# Patient Record
Sex: FEMALE | Race: WHITE | NOT HISPANIC OR LATINO | Employment: OTHER | URBAN - METROPOLITAN AREA
[De-identification: names, ages, dates, MRNs, and addresses within clinical notes are randomized per-mention and may not be internally consistent; named-entity substitution may affect disease eponyms.]

---

## 2023-06-06 ENCOUNTER — HOSPITAL ENCOUNTER (OUTPATIENT)
Dept: DATA CONVERSION | Facility: HOSPITAL | Age: 78
End: 2023-06-06
Attending: PHYSICIAN ASSISTANT
Payer: MEDICARE

## 2023-06-06 DIAGNOSIS — C50.012 MALIGNANT NEOPLASM OF NIPPLE AND AREOLA, LEFT FEMALE BREAST (MULTI): ICD-10-CM

## 2023-06-06 DIAGNOSIS — R91.8 OTHER NONSPECIFIC ABNORMAL FINDING OF LUNG FIELD: ICD-10-CM

## 2023-06-09 LAB
COMPLETE PATHOLOGY REPORT: NORMAL
CONVERTED CLINICAL DIAGNOSIS-HISTORY: NORMAL
CONVERTED FINAL DIAGNOSIS: NORMAL
CONVERTED FINAL REPORT PDF LINK TO COPY AND PASTE: NORMAL
CONVERTED GROSS DESCRIPTION: NORMAL

## 2023-07-24 ENCOUNTER — HOSPITAL ENCOUNTER (OUTPATIENT)
Dept: DATA CONVERSION | Facility: HOSPITAL | Age: 78
End: 2023-07-24
Attending: INTERNAL MEDICINE | Admitting: INTERNAL MEDICINE
Payer: MEDICARE

## 2023-07-24 DIAGNOSIS — R91.1 SOLITARY PULMONARY NODULE: ICD-10-CM

## 2023-07-24 LAB — POCT GLUCOSE: 96 MG/DL (ref 74–99)

## 2023-07-26 LAB
ADENOVIRUS QPCR, VIRC: NOT DETECTED COPIES/ML
ADENOVIRUS RVP, VIRC: NOT DETECTED
ASPERGILLUS GALACTOMANNAN EIA (NON-BLOOD SPECIMEN): 0.1
CHLAMYDIA.PNEUMONIAE PCR, VIRC: NOT DETECTED
COMPLETE PATHOLOGY REPORT: NORMAL
COMPLETE PATHOLOGY REPORT: NORMAL
CONVERTED CLINICAL DIAGNOSIS-HISTORY: NORMAL
CONVERTED CLINICAL DIAGNOSIS-HISTORY: NORMAL
CONVERTED DIAGNOSIS COMMENT: NORMAL
CONVERTED FINAL DIAGNOSIS: NORMAL
CONVERTED FINAL DIAGNOSIS: NORMAL
CONVERTED FINAL REPORT PDF LINK TO COPY AND PASTE: NORMAL
CONVERTED FINAL REPORT PDF LINK TO COPY AND PASTE: NORMAL
CONVERTED GROSS DESCRIPTION: NORMAL
CONVERTED RAPID EVALUATION: NORMAL
CONVERTED SPECIMEN DESCRIPTION: NORMAL
CYTOMEGALOVIRUS DNA PCR, (NON-BLOOD SPECI: NOT DETECTED IU/ML
ENTEROVIRUS/RHINOVIRUS RVP, VIRC: NOT DETECTED
HUMAN BOCAVIRUS RVP, VIRC: NOT DETECTED
HUMAN CORONAVIRUS RVP, VIRC: NOT DETECTED
HUMAN HERPESVIRUS-6 DNA PCR, QUANTITATIVE (NON-BLOOD SPECIMEN): NOT DETECTED COPIES/ML
INFLUENZA A , VIRC: NOT DETECTED
INFLUENZA A H1N1-09 , VIRC: NOT DETECTED
INFLUENZA B PCR, VIRC: NOT DETECTED
LEGIONELLA PNEUMO PCR, VIRC: NOT DETECTED
METAPNEUMOVIRUS , VIRC: NOT DETECTED
MYCOPLASMA.PNEUMONIAE PCR, VIRC: NOT DETECTED
PAN.LEGIONELLA PCR, VIRC: NOT DETECTED
PARAINFLUENZA PCR, VIRC: NOT DETECTED
PNEUMOCYSTIS PCR,QUANTITATIVE (NON-BLOOD SPECIMEN): NOT DETECTED COPIES/ML
RSV PCR, RVP, VIRC: NOT DETECTED

## 2023-07-27 LAB
GRAM STAIN: ABNORMAL
RESPIRATORY CULTURE/SMEAR: ABNORMAL

## 2023-08-28 ENCOUNTER — HOSPITAL ENCOUNTER (OUTPATIENT)
Dept: DATA CONVERSION | Facility: HOSPITAL | Age: 78
Discharge: HOME | End: 2023-08-28
Payer: MEDICARE

## 2023-08-28 DIAGNOSIS — R27.0 ATAXIA, UNSPECIFIED: ICD-10-CM

## 2023-08-28 DIAGNOSIS — N39.41 URGE INCONTINENCE: ICD-10-CM

## 2023-08-28 LAB
ANION GAP SERPL CALCULATED.3IONS-SCNC: 13 MMOL/L (ref 0–19)
BASOPHILS # BLD AUTO: 0.05 K/UL (ref 0–0.22)
BASOPHILS NFR BLD AUTO: 0.8 % (ref 0–1)
BILIRUB UR QL STRIP.AUTO: NEGATIVE
BUN SERPL-MCNC: 8 MG/DL (ref 8–25)
BUN/CREAT SERPL: 10 RATIO (ref 8–21)
CALCIUM SERPL-MCNC: 9.2 MG/DL (ref 8.5–10.4)
CHLORIDE SERPL-SCNC: 105 MMOL/L (ref 97–107)
CLARITY UR: CLEAR
CO2 SERPL-SCNC: 21 MMOL/L (ref 24–31)
COLOR UR: YELLOW
CREAT SERPL-MCNC: 0.8 MG/DL (ref 0.4–1.6)
DEPRECATED RDW RBC AUTO: 46 FL (ref 37–54)
DIFFERENTIAL METHOD BLD: ABNORMAL
EOSINOPHIL # BLD AUTO: 0.19 K/UL (ref 0–0.45)
EOSINOPHIL NFR BLD: 3 % (ref 0–3)
ERYTHROCYTE [DISTWIDTH] IN BLOOD BY AUTOMATED COUNT: 13.9 % (ref 11.7–15)
GFR SERPL CREATININE-BSD FRML MDRD: 74 ML/MIN/1.73 M2
GLUCOSE SERPL-MCNC: 108 MG/DL (ref 65–99)
GLUCOSE UR STRIP.AUTO-MCNC: NEGATIVE MG/DL
HCT VFR BLD AUTO: 35.9 % (ref 36–44)
HGB BLD-MCNC: 11.7 GM/DL (ref 12–15)
HGB UR QL STRIP.AUTO: ABNORMAL /HPF (ref 0–3)
HGB UR QL: NEGATIVE
IMM GRANULOCYTES # BLD AUTO: 0.03 K/UL (ref 0–0.1)
KETONES UR QL STRIP.AUTO: NEGATIVE
LEUKOCYTE ESTERASE UR QL STRIP.AUTO: NEGATIVE
LYMPHOCYTES # BLD AUTO: 1.6 K/UL (ref 1.2–3.2)
LYMPHOCYTES NFR BLD MANUAL: 25.5 % (ref 20–40)
MCH RBC QN AUTO: 29.5 PG (ref 26–34)
MCHC RBC AUTO-ENTMCNC: 32.6 % (ref 31–37)
MCV RBC AUTO: 90.4 FL (ref 80–100)
MICROSCOPIC (UA): ABNORMAL
MONOCYTES # BLD AUTO: 0.68 K/UL (ref 0–0.8)
MONOCYTES NFR BLD MANUAL: 10.8 % (ref 0–8)
NEUTROPHILS # BLD AUTO: 3.73 K/UL
NEUTROPHILS # BLD AUTO: 3.73 K/UL (ref 1.8–7.7)
NEUTROPHILS.IMMATURE NFR BLD: 0.5 % (ref 0–1)
NEUTS SEG NFR BLD: 59.4 % (ref 50–70)
NITRITE UR QL STRIP.AUTO: NEGATIVE
NRBC BLD-RTO: 0 /100 WBC
PH UR STRIP.AUTO: 5.5 [PH] (ref 4.6–8)
PLATELET # BLD AUTO: 142 K/UL (ref 150–450)
PMV BLD AUTO: 11.7 CU (ref 7–12.6)
POTASSIUM SERPL-SCNC: 4 MMOL/L (ref 3.4–5.1)
PROT UR STRIP.AUTO-MCNC: ABNORMAL MG/DL
RBC # BLD AUTO: 3.97 M/UL (ref 4–4.9)
SODIUM SERPL-SCNC: 139 MMOL/L (ref 133–145)
SP GR UR STRIP.AUTO: 1.02 (ref 1–1.03)
UNSPECIFIED CRY UR QL COMP ASSIST: ABNORMAL
URINE CULTURE: ABNORMAL
UROBILINOGEN UR QL STRIP.AUTO: NORMAL MG/DL (ref 0–1)
WBC # BLD AUTO: 6.3 K/UL (ref 4.5–11)
WBC #/AREA URNS AUTO: ABNORMAL /HPF (ref 0–3)

## 2023-08-31 LAB
FUNGAL CULTURE/SMEAR: ABNORMAL
FUNGAL SMEAR: ABNORMAL

## 2023-09-13 LAB
AFB CULTURE: NORMAL
AFB STAIN: NORMAL

## 2023-09-17 VITALS
HEART RATE: 80 BPM | BODY MASS INDEX: 28 KG/M2 | WEIGHT: 158 LBS | TEMPERATURE: 98.3 F | DIASTOLIC BLOOD PRESSURE: 76 MMHG | HEIGHT: 63 IN | SYSTOLIC BLOOD PRESSURE: 138 MMHG | OXYGEN SATURATION: 95 %

## 2023-09-29 VITALS — BODY MASS INDEX: 27.1 KG/M2 | WEIGHT: 158.73 LBS | HEIGHT: 64 IN

## 2023-10-29 DIAGNOSIS — E11.9 TYPE 2 DIABETES MELLITUS WITHOUT COMPLICATION, WITHOUT LONG-TERM CURRENT USE OF INSULIN (MULTI): Primary | ICD-10-CM

## 2023-10-30 ENCOUNTER — HOSPITAL ENCOUNTER (OUTPATIENT)
Dept: RADIOLOGY | Facility: HOSPITAL | Age: 78
Discharge: HOME | End: 2023-10-30
Payer: MEDICARE

## 2023-10-30 ENCOUNTER — TELEPHONE (OUTPATIENT)
Dept: PRIMARY CARE | Facility: CLINIC | Age: 78
End: 2023-10-30
Payer: MEDICARE

## 2023-10-30 DIAGNOSIS — C34.31 MALIGNANT NEOPLASM OF LOWER LOBE, RIGHT BRONCHUS OR LUNG (MULTI): ICD-10-CM

## 2023-10-30 DIAGNOSIS — E78.2 MIXED HYPERLIPIDEMIA: ICD-10-CM

## 2023-10-30 DIAGNOSIS — I10 PRIMARY HYPERTENSION: Primary | ICD-10-CM

## 2023-10-30 DIAGNOSIS — C50.911 MALIGNANT NEOPLASM OF UNSPECIFIED SITE OF RIGHT FEMALE BREAST (MULTI): ICD-10-CM

## 2023-10-30 PROCEDURE — 71260 CT THORAX DX C+: CPT

## 2023-10-30 PROCEDURE — 71260 CT THORAX DX C+: CPT | Performed by: RADIOLOGY

## 2023-10-30 PROCEDURE — 82565 ASSAY OF CREATININE: CPT | Mod: OUT

## 2023-10-30 PROCEDURE — 2550000001 HC RX 255 CONTRASTS: Performed by: INTERNAL MEDICINE

## 2023-10-30 RX ORDER — METFORMIN HYDROCHLORIDE 500 MG/1
500 TABLET, EXTENDED RELEASE ORAL
Qty: 90 TABLET | Refills: 1 | Status: SHIPPED | OUTPATIENT
Start: 2023-10-30 | End: 2024-03-25 | Stop reason: SDUPTHER

## 2023-10-30 RX ADMIN — IOHEXOL 75 ML: 350 INJECTION, SOLUTION INTRAVENOUS at 16:14

## 2023-10-31 RX ORDER — ATORVASTATIN CALCIUM 40 MG/1
40 TABLET, FILM COATED ORAL DAILY
Qty: 30 TABLET | Refills: 3 | Status: SHIPPED | OUTPATIENT
Start: 2023-10-31 | End: 2024-02-14 | Stop reason: SDUPTHER

## 2023-10-31 RX ORDER — AMLODIPINE BESYLATE 5 MG/1
5 TABLET ORAL DAILY
Qty: 30 TABLET | Refills: 3 | Status: SHIPPED | OUTPATIENT
Start: 2023-10-31 | End: 2024-02-14 | Stop reason: SDUPTHER

## 2023-10-31 RX ORDER — AMLODIPINE BESYLATE 5 MG/1
5 TABLET ORAL DAILY
COMMUNITY
Start: 2023-03-27 | End: 2023-10-31 | Stop reason: SDUPTHER

## 2023-10-31 RX ORDER — ATORVASTATIN CALCIUM 40 MG/1
40 TABLET, FILM COATED ORAL DAILY
COMMUNITY
End: 2023-10-31 | Stop reason: SDUPTHER

## 2023-11-02 PROBLEM — C50.919 MALIGNANT NEOPLASM OF FEMALE BREAST (MULTI): Status: ACTIVE | Noted: 2023-11-02

## 2023-11-02 PROBLEM — R22.0 FACIAL SWELLING: Status: ACTIVE | Noted: 2023-11-02

## 2023-11-02 PROBLEM — I10 HYPERTENSION: Status: ACTIVE | Noted: 2023-11-02

## 2023-11-02 PROBLEM — C34.90 SMALL CELL CARCINOMA OF LUNG (MULTI): Status: ACTIVE | Noted: 2023-11-02

## 2023-11-02 PROBLEM — I95.1 ORTHOSTATIC HYPOTENSION: Status: ACTIVE | Noted: 2023-11-02
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
## 2023-11-02 PROBLEM — E78.5 HYPERLIPIDEMIA: Status: ACTIVE | Noted: 2023-11-02

## 2023-11-02 PROBLEM — I67.9 CEREBROVASCULAR DISEASE: Status: ACTIVE | Noted: 2023-11-02

## 2023-11-02 PROBLEM — G45.9 TRANSIENT ISCHEMIC ATTACK: Status: ACTIVE | Noted: 2023-11-02

## 2023-11-02 PROBLEM — R55 SYNCOPE AND COLLAPSE: Status: ACTIVE | Noted: 2023-11-02

## 2023-11-02 PROBLEM — G62.9 POLYNEUROPATHY: Status: ACTIVE | Noted: 2023-11-02

## 2023-11-02 PROBLEM — R00.1 BRADYCARDIA: Status: ACTIVE | Noted: 2023-11-02

## 2023-11-02 PROBLEM — R42 DIZZINESS: Status: ACTIVE | Noted: 2023-11-02

## 2023-11-02 PROBLEM — R91.1 LUNG NODULE: Status: ACTIVE | Noted: 2023-11-02

## 2023-11-02 PROBLEM — N39.41 URGE INCONTINENCE OF URINE: Status: ACTIVE | Noted: 2023-11-02

## 2023-11-02 PROBLEM — G31.84 MILD COGNITIVE IMPAIRMENT: Status: ACTIVE | Noted: 2023-11-02

## 2023-11-02 PROBLEM — I25.10 CORONARY ARTERIOSCLEROSIS: Status: ACTIVE | Noted: 2023-11-02

## 2023-11-02 PROBLEM — R47.01 APHASIA: Status: ACTIVE | Noted: 2023-11-02

## 2023-11-02 PROBLEM — M54.16 LUMBAR RADICULOPATHY: Status: ACTIVE | Noted: 2018-07-20

## 2023-11-02 PROBLEM — S01.91XA LACERATION OF HEAD: Status: ACTIVE | Noted: 2023-11-02

## 2023-11-02 PROBLEM — M54.30 SCIATICA: Status: ACTIVE | Noted: 2023-11-02

## 2023-11-02 PROBLEM — J44.9 CHRONIC OBSTRUCTIVE PULMONARY DISEASE (MULTI): Status: ACTIVE | Noted: 2023-11-02

## 2023-11-02 PROBLEM — R27.0 ATAXIA: Status: ACTIVE | Noted: 2023-11-02

## 2023-11-02 PROBLEM — E11.9 TYPE 2 DIABETES MELLITUS (MULTI): Status: ACTIVE | Noted: 2023-11-02

## 2023-11-02 PROBLEM — K57.30 DIVERTICULOSIS OF COLON: Status: ACTIVE | Noted: 2023-11-02

## 2023-11-02 PROBLEM — K20.90 ESOPHAGITIS: Status: ACTIVE | Noted: 2023-11-02

## 2023-11-02 PROBLEM — I10 BENIGN ESSENTIAL HYPERTENSION: Status: ACTIVE | Noted: 2023-11-02

## 2023-11-02 PROBLEM — R04.2 HEMOPTYSIS: Status: ACTIVE | Noted: 2023-11-02

## 2023-11-02 PROBLEM — S09.90XA INJURY OF HEAD: Status: ACTIVE | Noted: 2023-11-02

## 2023-11-02 RX ORDER — ESOMEPRAZOLE MAGNESIUM 40 MG/1
CAPSULE, DELAYED RELEASE ORAL
COMMUNITY
End: 2023-12-20 | Stop reason: ALTCHOICE

## 2023-11-02 RX ORDER — LISINOPRIL 2.5 MG/1
TABLET ORAL
COMMUNITY
Start: 2014-04-08 | End: 2023-12-20 | Stop reason: WASHOUT

## 2023-11-02 RX ORDER — PANTOPRAZOLE SODIUM 40 MG/1
TABLET, DELAYED RELEASE ORAL
COMMUNITY
End: 2023-12-20 | Stop reason: ALTCHOICE

## 2023-11-02 RX ORDER — ONDANSETRON HYDROCHLORIDE 8 MG/1
TABLET, FILM COATED ORAL
COMMUNITY
End: 2023-12-20 | Stop reason: ALTCHOICE

## 2023-11-02 RX ORDER — NAPROXEN SODIUM 220 MG/1
TABLET, FILM COATED ORAL
COMMUNITY

## 2023-11-02 RX ORDER — OXYBUTYNIN CHLORIDE 5 MG/1
TABLET ORAL
COMMUNITY
Start: 2023-08-28 | End: 2023-12-20 | Stop reason: ALTCHOICE

## 2023-11-02 RX ORDER — ACETAMINOPHEN 325 MG/1
650 TABLET ORAL EVERY 4 HOURS PRN
COMMUNITY

## 2023-11-02 RX ORDER — LISINOPRIL 10 MG/1
TABLET ORAL
COMMUNITY
End: 2023-12-20 | Stop reason: ALTCHOICE

## 2023-11-02 RX ORDER — IPRATROPIUM BROMIDE AND ALBUTEROL 20; 100 UG/1; UG/1
SPRAY, METERED RESPIRATORY (INHALATION)
COMMUNITY
Start: 2014-08-11

## 2023-11-02 RX ORDER — MIRTAZAPINE 15 MG/1
TABLET, FILM COATED ORAL
COMMUNITY
End: 2023-12-20 | Stop reason: ALTCHOICE

## 2023-11-02 RX ORDER — PROCHLORPERAZINE MALEATE 10 MG
TABLET ORAL
COMMUNITY
End: 2023-12-20 | Stop reason: ALTCHOICE

## 2023-11-02 RX ORDER — ANASTROZOLE 1 MG/1
TABLET ORAL
COMMUNITY
Start: 2014-01-10 | End: 2023-12-20 | Stop reason: ALTCHOICE

## 2023-11-02 RX ORDER — GABAPENTIN 300 MG/1
1 CAPSULE ORAL 3 TIMES DAILY
COMMUNITY
End: 2023-12-20 | Stop reason: ALTCHOICE

## 2023-11-02 RX ORDER — CARVEDILOL 3.12 MG/1
TABLET ORAL
COMMUNITY
Start: 2014-06-05 | End: 2023-12-20 | Stop reason: ALTCHOICE

## 2023-11-03 ENCOUNTER — INFUSION (OUTPATIENT)
Dept: HEMATOLOGY/ONCOLOGY | Facility: CLINIC | Age: 78
End: 2023-11-03
Payer: MEDICARE

## 2023-11-03 ENCOUNTER — OFFICE VISIT (OUTPATIENT)
Dept: HEMATOLOGY/ONCOLOGY | Facility: CLINIC | Age: 78
End: 2023-11-03
Payer: MEDICARE

## 2023-11-03 ENCOUNTER — APPOINTMENT (OUTPATIENT)
Dept: LAB | Facility: HOSPITAL | Age: 78
End: 2023-11-03
Payer: MEDICARE

## 2023-11-03 VITALS
WEIGHT: 147.82 LBS | SYSTOLIC BLOOD PRESSURE: 115 MMHG | BODY MASS INDEX: 27.2 KG/M2 | HEIGHT: 62 IN | HEART RATE: 79 BPM | TEMPERATURE: 97.2 F | DIASTOLIC BLOOD PRESSURE: 72 MMHG | RESPIRATION RATE: 16 BRPM | OXYGEN SATURATION: 95 %

## 2023-11-03 DIAGNOSIS — C34.90 SMALL CELL CARCINOMA OF LUNG (MULTI): ICD-10-CM

## 2023-11-03 DIAGNOSIS — B49 FUNGAL INFECTION OF LUNG: Primary | ICD-10-CM

## 2023-11-03 DIAGNOSIS — C34.31 MALIGNANT NEOPLASM OF LOWER LOBE, RIGHT BRONCHUS OR LUNG (MULTI): ICD-10-CM

## 2023-11-03 PROCEDURE — 1159F MED LIST DOCD IN RCRD: CPT | Performed by: INTERNAL MEDICINE

## 2023-11-03 PROCEDURE — 1125F AMNT PAIN NOTED PAIN PRSNT: CPT | Performed by: INTERNAL MEDICINE

## 2023-11-03 PROCEDURE — 1160F RVW MEDS BY RX/DR IN RCRD: CPT | Performed by: INTERNAL MEDICINE

## 2023-11-03 PROCEDURE — 3078F DIAST BP <80 MM HG: CPT | Performed by: INTERNAL MEDICINE

## 2023-11-03 PROCEDURE — 99213 OFFICE O/P EST LOW 20 MIN: CPT | Performed by: INTERNAL MEDICINE

## 2023-11-03 PROCEDURE — 3074F SYST BP LT 130 MM HG: CPT | Performed by: INTERNAL MEDICINE

## 2023-11-03 RX ORDER — VITAMIN B COMPLEX
1 CAPSULE ORAL DAILY
COMMUNITY

## 2023-11-03 RX ORDER — FERROUS SULFATE, DRIED 160(50) MG
1000 TABLET, EXTENDED RELEASE ORAL DAILY
COMMUNITY

## 2023-11-03 ASSESSMENT — PATIENT HEALTH QUESTIONNAIRE - PHQ9
2. FEELING DOWN, DEPRESSED OR HOPELESS: NOT AT ALL
SUM OF ALL RESPONSES TO PHQ9 QUESTIONS 1 AND 2: 0
1. LITTLE INTEREST OR PLEASURE IN DOING THINGS: NOT AT ALL

## 2023-11-03 ASSESSMENT — COLUMBIA-SUICIDE SEVERITY RATING SCALE - C-SSRS
1. IN THE PAST MONTH, HAVE YOU WISHED YOU WERE DEAD OR WISHED YOU COULD GO TO SLEEP AND NOT WAKE UP?: NO
2. HAVE YOU ACTUALLY HAD ANY THOUGHTS OF KILLING YOURSELF?: NO
6. HAVE YOU EVER DONE ANYTHING, STARTED TO DO ANYTHING, OR PREPARED TO DO ANYTHING TO END YOUR LIFE?: NO

## 2023-11-03 ASSESSMENT — NCCN CANCER DISTRESS MANAGEMENT: NCCN PHYSICAL CONCERNS: 6

## 2023-11-03 ASSESSMENT — ENCOUNTER SYMPTOMS
DEPRESSION: 0
LOSS OF SENSATION IN FEET: 0
OCCASIONAL FEELINGS OF UNSTEADINESS: 1

## 2023-11-03 ASSESSMENT — PAIN SCALES - GENERAL: PAINLEVEL: 2

## 2023-11-03 NOTE — PROGRESS NOTES
Patient ID: Vidhi Medrano is a 78 y.o. female.  Referring Physician: No referring provider defined for this encounter.  Primary Care Provider: Vineet Craig MD      Subjective    HPI    Chief Complaint: follow up lung cancer   Interval History:    Patient of Dr. White and DORIS Schreiber      Reason for visit: Right Lung nodule , hx of small cell lung cancer      HPI      78 year old woman with  hx of limited stage small cell lung cancer diagnosed June 2018 s/p definitive chemoxrt with carboplatin/Etoposide followed by PCI, cTxN2  presented with only hyper metabolic active mediastinal nodes and no lung mass , HTN, HLD, DM, CAD s/p MI, TIA, peripheral neuropathy, smoking, COPD, OA s/p left TKA, R breast cancer originally diagnosed  in 2011 s/p R mastectomy ER/MA positive, pT1cN0, 1.1 cm tumor, grade 1, s/p adjuvant Arimidex until her lung cancer diagnosis,  who was following for surveillance of her cancer      she had a CT screening in 2018 that showed an enlarged prevascular LN   PET scan showed uptake in the LN and findings suggestive of adrenal adenoma  brain MRI showed single 6.5 contrast enhancement area overlying the left frontal lobe non specific   treated with concurrent chemoradiation followed by PCI   I also see a report of a mediastinal lymph node biopsy in Nov 2018 that showed findings suggestive of CD10+ lymphoproliferative disease, not clear the circumstances of biopsy from available records     She was on surveillance with CT scans   CT chest April 2021: RLL calcified granuloma , no other susupicious nodules, mildly prominent subcarinal LN   CT scan of chest Oct 2021: small nodule AZUCENA measuring 4 mm, stable   Ct scan Nov 2022: stable tiny b/l nodules, new AZUCENA 3 mm nodule looks suspicious, 8 x 6 mm infiltrative nodule in AZUCENA could be slowly growing tumor   CT scan March 2023: increased nodule in AZUCENA 9 x 8 mm previously 5 x 4 mm (?comparison CT) , new 4 mm RUL nodule  PET scan 5/9/23:  intense metabolic activity in AZUCENA nodule SUV 6.1, non specific activity in area of femoral head   Bone scan 5/26: no abnormal radiotracer uptake   6/6/23 bx of AZUCENA nodule: Alveloar lung parenchyma, non diagnostic, limited tissue      she denies major change in breathing   no increased SOB or COLINDRES   she can walk a bit but not too long   she feels weakness in her legs   this has worsened over the last year or so   now only can do some walking   hx of TIA in Feb 2023  had some falls   no headaches or back pain   gets lightheaded   has memory changes   no increased cough   no oxygen requirements   eating and drinking well, it is stable now      interval history- 11/3/23      She has balance issues, and had few recurrent falls in the last few months ,  she is using a different walker now   She is having physical therapy as well and will possibly see neurology for her cognitive function   No respiratory symptoms at this time, no increased cough or SOB   Eating and drinking well, she does eat big meals but she snacks in between   no nausea, vomiting   has chronic neuropathic pain in the feet and possibly will see neurology as above  Denies any fever, infections, admissions for other issues        PAST MEDICAL HISTORY:   hx of limited stage small cell lung cancer diagnosed June 2018 s/p definitive chemoxrt with carboplatin/Etoposide followed by PCI , cTxN2 presented with only hyper metabolic active mediastinal nodes and no lung mass , HTN, HLD, DM, CAD s/p MI, TIA, peripheral neuropathy, smoking, COPD, OA s/p left TKA, R breast cancer originally diagnosed in 2011 s/p R mastectomy     SOCIAL HISTORY:   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     REVIEW OF  "SYSTEMS:  Pertinent finding as per the history above.  There are no additional specific symptoms pertaining to eyes, ENT, hematologic, lymphatic, neurological, psychiatric, cardiac,  pulmonary, GI, , endocrine, rheumatic, dermatological, or musculoskeletal systems.  All other systems have been reviewed and generally negative and noncontributory.     PHYSICAL EXAMINATION:    GENERAL:  Age-appropriate, in no acute discomfort.    VITAL SIGNS:  Reviewed in the EMR and were noted to be stable.    HEENT:  Normocephalic and atraumatic.  Mucous membranes are moist. No oral lesions.   NECK:  Supple without lymphadenopathy.  No thyromegaly or bruits.    CHEST:  Clear to auscultation bilaterally with some diminished breath sounds   HEART:  Regular in rate and rhythm.  No gallop, rub, or murmur.    ABDOMEN:  Soft, nontender, and nondistended. No hepatosplenomegaly.    EXTREMITIES:  No cyanosis, clubbing, or edema.  NEUROLOGICAL:  Alert, awake, and oriented.  b/l LE weakness R>L , in wheelchair   LYMPHATICS: No significant lymphadenopathy.     LAB DATA:  Latest labs were reviewed in the EMR and from the outside sources.    Objective   BSA: 1.72 meters squared  /72 (BP Location: Left arm, Patient Position: Sitting, BP Cuff Size: Large adult)   Pulse 79   Temp 36.2 °C (97.2 °F) (Temporal)   Resp 16   Ht (S) 1.585 m (5' 2.4\")   Wt 67 kg (147 lb 13.1 oz)   SpO2 95%   BMI 26.69 kg/m²       No family history on file.  Oncology History    No history exists.       Vidhi Medrano  has no history on file for tobacco use.  She  has no history on file for alcohol use.  She  has no history on file for drug use.    Results     FINAL DIAGNOSIS   LEFT LUNG BIOPSY:   --ALVEOLATED LUNG PARENCHYMA WITH NO SIGNIFICANT DIAGNOSTIC FINDINGS. SEE   NOTE.     Note:  There is limited tissue present for evaluation. Deeper levels were   attempted, however, no additional tissue is identified block. No mass lesion   is identified in this " "biopsy specimen. Rebiopsy is recommended as clinically   indicated.        Electronically Signed Out By TAE LANTIGUA MD/HEDY   By the signature on this report, the individual or group listed as making the   Final Interpretation/Diagnosis certifies that they  have reviewed this case.   Diagnostic interpretation performed at [facility] [address] [place] [id number]       Clinical History:   Left lung mass     Specimens Submitted As:   A: LEFT LUNG BIOPSY      Gross Description:   Received in formalin, labeled with the patient´s name and hospital number and   \"A, left lung biopsy\", are multiple minute pink-white, soft tissue fragments   aggregating to 1.5 x 0.1 x 0.1 cm. The specimen is submitted  in toto in one   cassette. The specimen may not survive processing.   [facility]   Department of Pathology   [address] [place] [address]     =========================== Next Report ===========================     Cytology-Non GYN [Jul 26 2023 12:11PM]  ([id number])    Specimens: BRONCHIAL BRUSH OF LEFT UPPER LOBE NODULE /FINE NEEDLE ASPIRATION LUNG - LEFT UPPER LOBE NODULE /BRONCHO-ALVEOLAR  LAVAGE OF LEFT UPPER LOBE /FINE NEEDLE ASPIRATION 11L LYMPH NODE /BRONCHIAL BRUSH OF LEFT UPPER LOBE NODULE /FINE NEEDLE ASPIRATION LUNG - LEFT UPPER LOBE NODULE /BRONCHO-ALVEOLAR LAVAGE OF LEFT UPPER LOBE /FINE NEEDLE ASPIRATION 11L LYMPH NODE        MRN: [id number]   Patient Name GURPREET SOLOMON   Accession #: [id number]   Date of Procedure: 7/24/2023   Date Reported: 7/26/2023   Date Received: 7/24/2023   Date of Birth / Sex 1945 (Age: 78) / F   Race: WHITE    Submitting Physician: GEOVANY ANDERSON MD   Attending Physician: CARLOS GROSS M.D.       Other External#     FINAL CYTOLOGICAL INTERPRETATION     A. BRONCHIAL BRUSH OF LEFT UPPER LOBE NODULE, CYTOLOGY AND CELL BLOCK:    --BLOOD AND RARE BRONCHIAL CELLS ONLY; NON-DIAGNOSTIC.     B. FINE " NEEDLE ASPIRATION LUNG - LEFT UPPER LOBE NODULE, CYTOLOGY AND CELL   BLOCK:   --NON-DIAGNOSTIC SPECIMEN; BLOOD ONLY.     C. BRONCHO-ALVEOLAR LAVAGE OF LEFT UPPER  LOBE:   --NO MALIGNANT CELLS IDENTIFIED.     D. FINE NEEDLE ASPIRATION 11L LYMPH NODE, CYTOLOGY AND CELL BLOCK:   --NO MALIGNANT CELLS IDENTIFIED. LIMITED LYMPHOID SPECIMEN.     The gross and/or microscopic findings were reviewed  in conjunction with   pathology resident, Alan Martin MD.         Slide(s) initially screened by a Cytotechnologist at [address], [place] [address]        Electronically Signed Out By Job Chang MD     By the signature on this report, the individual or group listed as making the   Final Interpretation/Diagnosis certifies that they have reviewedthis case.   Slide(s) initially screened  by a Cytotechnologist at [facility]   Diagnostic interpretation performed at [address] [place] [address]       Rapid Evaluation       Brushing Immediate Read Result:   A. Non-diagnostic,  specimen consists of bronchial cells.     Fine Needle Aspiration Immediate Read Result:   B. Non-diagnostic, almost entirely blood.   D. Lymphoid, negative.     Pathologist: Anton Gibson M.D. Date: 7.24.2023     Clinical  History       GROWING 9MM PET LEFT UPPER LOBE NODULE; HISTORY OF LIMITED STAGE SMALL CELL   LUNG CANCER.   Source of Specimen   A: BRONCHIAL BRUSH OF LEFT UPPER LOBE NODULE   B: FINE NEEDLE ASPIRATION LUNG - LEFT UPPER LOBE NODULE    C: BRONCHO-ALVEOLAR LAVAGE OF LEFT UPPER LOBE   D: FINE NEEDLE ASPIRATION 11L LYMPH NODE     Specimen Submitted as:   A: BRONCHIAL BRUSH OF LEFT UPPER LOBE NODULE   Pap stain Non-Gyn, Pap stain Non-Gyn, Diff-Quik stain Non-Gyn, Diff-Quik    stain Non-Gyn, CELL BLOCK, H&E, Initial   B: FINE NEEDLE ASPIRATION LUNG - LEFT UPPER LOBE NODULE   Pap stain Non-Gyn, Pap stain Non-Gyn, Diff-Quik stain  Non-Gyn, Diff-Quik   stain Non-Gyn, CELL BLOCK, H&E, Initial   C: BRONCHO-ALVEOLAR  LAVAGE OF LEFT UPPER LOBE   Pap non-gyn ThinPrep slide   D: FINE NEEDLE ASPIRATION 11L LYMPH NODE   Pap stain Non-Gyn, Pap stain Non-Gyn, Diff-Quik stain Non-Gyn, Diff-Quik   stain Non-Gyn, CELL BLOCK, H&E, Initial     Gross Description    A. BRONCHIAL BRUSH OF LEFT UPPER LOBE NODULE: RECEIVED 4 DIRECT SMEARS (2   AIR-DRIED DIFF-QUIK AND 2 SPRAY-FIXED) AND 30cc OF PINK CLEAR NEEDLE RINSE IN   CYTOLYT WITH FEW PARTICLES AND 2 BRUSHES.     B. FINE NEEDLE ASPIRATION LUNG - LEFT  UPPER LOBE NODULE: RECEIVED 4 DIRECT   SMEARS (2AIR-DRIED DIFF-QUIK AND 2 SPRAY-FIXED) AND 30cc OF RED CLEAR NEEDLE   RINSE IN CYTOLYT WITH PARTICLES.     C. BRONCHO-ALVEOLAR LAVAGE OF LEFT UPPER LOBE: RECEIVED 10cc OF FRESH PINK    CLOUDY FLUID WITH PARTICLES.     D. FINE NEEDLE ASPIRATION 11L LYMPH NODE: RECEIVED 4 DIRECT SMEARS (2   AIR-DRIED DIFF-QUIK AND 2 SPRAY-FIXED) AND 40cc OF RED CLEAR NEEDLE RINSE IN   CYTOLYT WITH PARTICLES.              [facility]   Department of Pathology   [address] [place] [address]     =========================== Next Report ===========================      Surgical Pathology [Jul 26 2023 5:03PM] ([id number])    Specimens: AZUCENA NODULE, TBBX     Name GURPREET SOLOMON     Accession #: [id number]   Pathologist: SHAMIR PHILLIPS MD   Date of Procedure: 7/24/2023   Date Received: 7/24/2023   Date Reported 7/26/2023   Submitting Physician: CARLOS GROSS M.D.   Location: [place] Other  External #             FINAL DIAGNOSIS   A. LEFT LUNG, UPPER LOBE NODULE, TRANSBRONCHIAL BIOPSY:   -- SMALL FRAGMENT OF DENSE FIBROUS TISSUE AND BLOOD CLOT; SEE NOTE.   -- MULTIPLE LEVELS EXAMINED FOR FINAL DIAGNOSIS.      NOTE: There is no evidence of a mass forming lesion in this limited biopsy.   Correlation with clinical and radiologic features is needed for further   evaluation. Re-biopsy  may be considered, if clinically warranted.      CT chest w IV contrast    Result Date: 10/31/2023  Interpreted By:  Sofia Corrales and Marshall Colin STUDY: CT CHEST W IV CONTRAST;  10/30/2023 4:11 pm   INDICATION: Signs/Symptoms: restaging  C34.31: Malignant neoplasm of lower lobe, right bronchus or lung C50.911: Malignant neoplasm of right breast.   COMPARISON: CT chest dated 07/24/2023, 03/27/2023 and 11/30/2022.   ACCESSION NUMBER(S): [id number]   ORDERING CLINICIAN: CARLOS GROSS   TECHNIQUE: Helical data acquisition of the chest was obtained  after intravenous administration of 75 mL Omnipaque 350. Images were reformatted in axial, coronal, and sagittal planes.   FINDINGS: LUNGS AND AIRWAYS: The trachea and central airways are patent. No endobronchial lesion.   There is interval decreased prominence of irregular morphology nodule in the left upper lobe which measures up to 9 mm as seen on image 95 when compared with the previous CT chest studies from 07/24/2023. An adjacent curvilinear structure in the anteroinferior aspect of this nodule in the left upper lobe appears similar   Stable size of a 0.3 cm left lower lobe pulmonary nodule (image 129). No new or enlarging discrete suspicious pulmonary nodules.   No focal consolidation, pleural effusion, or pneumothorax.   MEDIASTINUM AND ABHIJEET, LOWER NECK AND AXILLA: The visualized thyroid gland is within normal limits.   No evidence of thoracic lymphadenopathy by CT criteria.   Esophagus appears within normal limits as seen.   HEART AND VESSELS: The thoracic aorta is of normal course and caliber with severe vascular calcifications.   Main pulmonary artery and its branches are normal in caliber.   Mild coronary artery calcifications are seen. The study is not optimized for evaluation of coronary arteries.   The cardiac chambers are not enlarged.   No evidence of pericardial effusion.   UPPER ABDOMEN: Unchanged appearance of low-density adrenal nodules  measuring up to 1.7 cm on the left and 0.9 cm on the right. Unchanged appearance of simple fluid attenuating cysts within the right kidney measuring up to 1.3 cm in diameter. Otherwise, the partially visualized subdiaphragmatic structures are unremarkable in appearance.   CHEST WALL AND OSSEOUS STRUCTURES: Postoperative changes of right mastectomy again noted and unremarkable in appearance. The chest wall soft tissues are otherwise unremarkable in appearance. No acute osseous abnormalities or suspicious osseous lesions.       1.  There is interval decreased prominence of irregular morphology nodule in the left upper lobe which measures up to 9 mm when compared with the previous CT chest study from 07/24/2023. This may represent inflammatory nodule. Continued follow-up is suggested until resolution. An adjacent curvilinear structure in the anteroinferior aspect of this nodule in the left upper lobe appears similar. 2. Additional subcentimeter pulmonary nodules are stable in size as compared with prior exam and described above. No new or enlarging discrete suspicious pulmonary nodules. 3. No acute cardiopulmonary process.     I personally reviewed the images/study and I agree with the resident findings as stated. This study was interpreted at [place], [place].   Signed by: Sofia Corrales 10/31/2023 10:58 PM Dictation workstation:   [id number]     Assessment/Plan      1. History of limited stage small cell lung cancer      hx if limited stage SCLC s/p definitive chemoxrt with carbo/etoposide/ xrt in 2018   followed by PCI   now with a new AZUCENA lung nodule with FDG avidity   non diagnostic biopsy      we discussed again to refer for another bx transcutaneous or via navigational bronchoscopy   she wants to think about it   alternative include evaluation for treatment due to high suspicion and referral to radiation oncology to check eligibility for sbrt given prior  radiation, also wants to wait and think about it      will check a brain MRI in meantime given neurologic symptoms      8/1- MRI brain was negative   referred for EBUS and bx, performed with Dr. Fitch, lung nodule was small and less solid on navigational bronc scan, path showed no malignancy   will plan to repeat a scan in 3 months, but will review imaging on TB if there is any suggestion to treat regardless of biopsy vs surveillance imaging     11/3/2023-  Case was reviewed on the thoracic tumor board and recommended for a repeat CT scan of the chest  The repeat CT scan was done on 10/30/2023 and is reassuring, lung nodule of concern has decreased in the size and there are other stable lung nodules  At this time there is no evidence of malignancy, we will plan to repeat another CT scan of the chest in 6 months and perhaps afterwards she can go to yearly low-dose CT screening if there are no concerning findings    Noted on cultures from the bronchoscopy there was both staph and the mold, she stated she did not take antibiotics or antifungals, but she is pretty much asymptomatic, will refer to infectious disease to determine if any treatment is needed at all     2. hx of breast cancer   s/p right mastectomy   completed arimidex adjuvant endocrine therapy      RTC 6 months with CT chest    Stanfrod Pizarro MD

## 2023-11-13 ENCOUNTER — TELEPHONE (OUTPATIENT)
Dept: PRIMARY CARE | Facility: CLINIC | Age: 78
End: 2023-11-13
Payer: MEDICARE

## 2023-11-13 NOTE — TELEPHONE ENCOUNTER
Dee called to state that Pt isn't having any improvement with urinary incontinence medication, Oxybu___. She is having side effects such as confusion, awakening in the middle of the night, disorientation, etc. Dee would like to know if the pt can stop the drug cold turkey, or does she need to be weaned off of it?     Also, the neuro doc that pt was referred to is in the same office as the last one, and Dee doesn't want to drive to [place]. Can Dr CORONA recommend a neuro in [facility] or [place]?    Requests c/b.

## 2023-11-14 NOTE — TELEPHONE ENCOUNTER
Dee called back this morning stating she had missed a call. She is not able to get her VM mail open and is asking to speak with the nurse.

## 2023-11-20 ENCOUNTER — TELEPHONE (OUTPATIENT)
Dept: PRIMARY CARE | Facility: CLINIC | Age: 78
End: 2023-11-20
Payer: MEDICARE

## 2023-11-20 NOTE — TELEPHONE ENCOUNTER
Prior Authorization Approval    Authorization Effective Date: 2/27/2021  Authorization Expiration Date: 11/27/2021  Medication: galcanezumab-gnlm (EMGALITY) 120 MG/ML-APPROVED  Approved Dose/Quantity:    Reference #:     Insurance Company: [employer] [place] - Phone [phone] Fax [phone]  Expected CoPay:       CoPay Card Available:      Foundation Assistance Needed:    Which Pharmacy is filling the prescription (Not needed for infusion/clinic administered): [id number] - [place], [place] - [address] AT [address] [place] [address] & [facility]  Pharmacy Notified: Yes  Patient Notified: Yes  **Instructed pharmacy to notify patient when script is ready to /ship.**           Pt's daughter is calling to get a referral for local ENT for hearing decrease. The inner ears are swelling, she been to the [facility] clinic in Oct. and 5 more times had 10 d of amox and ears drops. [facility] were full of wax and swollen. So she went back and forth the the [facility] clinic. Please advice Dee [phone]

## 2023-12-20 ENCOUNTER — OFFICE VISIT (OUTPATIENT)
Dept: NEUROLOGY | Facility: CLINIC | Age: 78
End: 2023-12-20
Payer: MEDICARE

## 2023-12-20 VITALS
BODY MASS INDEX: 28.52 KG/M2 | WEIGHT: 155 LBS | SYSTOLIC BLOOD PRESSURE: 132 MMHG | DIASTOLIC BLOOD PRESSURE: 62 MMHG | HEIGHT: 62 IN | HEART RATE: 74 BPM

## 2023-12-20 DIAGNOSIS — E11.42 POLYNEUROPATHY DUE TO TYPE 2 DIABETES MELLITUS (MULTI): ICD-10-CM

## 2023-12-20 DIAGNOSIS — F03.90 DEMENTIA WITHOUT BEHAVIORAL DISTURBANCE (MULTI): Primary | ICD-10-CM

## 2023-12-20 DIAGNOSIS — E55.9 VITAMIN D DEFICIENCY: ICD-10-CM

## 2023-12-20 PROCEDURE — 99205 OFFICE O/P NEW HI 60 MIN: CPT | Performed by: PSYCHIATRY & NEUROLOGY

## 2023-12-20 PROCEDURE — 1159F MED LIST DOCD IN RCRD: CPT | Performed by: PSYCHIATRY & NEUROLOGY

## 2023-12-20 PROCEDURE — 1125F AMNT PAIN NOTED PAIN PRSNT: CPT | Performed by: PSYCHIATRY & NEUROLOGY

## 2023-12-20 PROCEDURE — 3075F SYST BP GE 130 - 139MM HG: CPT | Performed by: PSYCHIATRY & NEUROLOGY

## 2023-12-20 PROCEDURE — 1036F TOBACCO NON-USER: CPT | Performed by: PSYCHIATRY & NEUROLOGY

## 2023-12-20 PROCEDURE — 3078F DIAST BP <80 MM HG: CPT | Performed by: PSYCHIATRY & NEUROLOGY

## 2023-12-20 PROCEDURE — 1160F RVW MEDS BY RX/DR IN RCRD: CPT | Performed by: PSYCHIATRY & NEUROLOGY

## 2023-12-20 RX ORDER — MEMANTINE HYDROCHLORIDE 5 MG/1
5 TABLET ORAL 2 TIMES DAILY
Qty: 120 TABLET | Refills: 2 | Status: SHIPPED | OUTPATIENT
Start: 2023-12-20 | End: 2024-03-20 | Stop reason: SDUPTHER

## 2023-12-20 ASSESSMENT — MINI MENTAL STATE EXAM
NAME OR REPEAT 3 OBJECTS - (APPLE, TABLE, PENNY) OR (BALL, TREE, FLAG): 3 CORRECT
HAND THE PERSON A PENCIL AND PAPER. SAY:  WRITE ANY COMPLETE SENTENCE ON THAT PIECE OF PAPER. (NOTE: THE SENTENCE MUST MAKE SENSE.  IGNORE SPELLING ERRORS): 1 CORRECT
SAY:  READ THE WORDS ON THE PAGE AND THEN DO WHAT IT SAYS.  THEN HAND THE PERSON THE SHEET WITH CLOSE YOUR EYES ON IT.  IF THE SUBJECT READS AND DOES NOT CLOSE THEIR EYES, REPEAT UP TO THREE TIMES.  SCORE ONLY IF SUBJECT CLOSES EYES.: 3 CORRECT
PLEASE COPY THIS PICTURE (NOTE ALL 10 ANGLES MUST BE PRESENT AND TWO MUST INTERSECT): 0 CORRECT
SHOW: PENCIL [OBJECT] ASK: WHAT IS THIS CALLED?: 2 CORRECT
WHAT IS THE YEAR, SEASON, DATE, DAY, AND MONTH: 5 CORRECT
SAY: I WOULD LIKE YOU TO REPEAT THIS PHRASE AFTER ME: NO IFS, ANDS, OR BUTS.: 1 CORRECT
SUM ALL MMSE QUESTIONS FOR TOTAL SCORE [OUT OF 30].: 20
PLACE DESIGN, ERASER AND PENCIL IN FRONT OF THE PERSON.  SAY:  COPY THIS DESIGN PLEASE.  SHOW: DESIGN. ALLOW: MULTIPLE TRIES. WAIT UNTIL PERSON IS FINISHED AND HANDS IT BACK. SCORE: ONLY FOR DIAGRAM WITH 4-SIDED FIGURE BETWEEN TWO 5-SIDED FIGURES: 1 CORRECT
RECALL THE 3 OBJECTS FROM ABOVE (APPLE, TABLE, PENNY) OR (BALL, TREE, FLAG): 2 CORRECT
WHAT STATE, COUNTRY, CITY, HOSPITAL, FLOOR: 2 CORRECT
SPELL THE WORD WORLD FORWARD AND BACKWARDS OR SERIAL 7S: 0 CORRECT

## 2023-12-20 NOTE — PROGRESS NOTES
"Subjective   Vidhi Medrano is a 78 y.o.   female.  HPI  This is a 78 year old right handed woman with her daughter Dee, referred by her PCP (Dr. Craig) for memory loss.  She had small cell CA of the lung, had radiation and chemo in 2019- memory loss was expected, and in the last year she has had increased memory impairment.  She started oxybutynin in July- worsened her cognition, stopped in November- improved since then.  She lives with her daughter, and her daughter noticed that the patient was not keeping her medications since the end of October.  Dee has POA.    The patient has noticed memory loss.  She took early child education classes and cosmetology classes after finishing high school.  She obtained an Associate's degree, and taught , head start.  Her grandmother had dementia.  Her PMH was reviewed, only pertinent is DM-2  with peripheral neuropathy.  She had a TIA in 2/23- Was at [facility], perfomed a MRI without contrast, talking gibberish, for 30 seconds.  She followed up with Neurology at that time, no cause for the TIA identified.  MRI of the brain without and with contrast, 6/30/23 showed confluent periventricular white matter changes, increased since 2019, no abnormal enhancement.  She does her self-care activities, balance is off.  She has had loss of ability to feed the birds and gardening.  She previously did painting and cook.  She does not drive.  She wakes up at night, naps during the day.she has some mood changes, may have hallucinations/delusions- has vivid dreaming.  She has had burning and numbness in the bottom of the feet for 2 plus years.  Objective     Neurological Exam  Mental Status   MMSE score comment: 5 words given beginning with the letter \"F\"..    Mental Status: Awake and alert.  She follows commands, interactive, smiles.  Cardiopulmonary: RRR, normal S1,S2, no m/g/r. Lungs CTA w/o adventitious sounds. No respiratory distress.     CN: Pupils were 3/4mm to 2/2mm. " VF intact to finger counting. Ocular versions were intact. Facial sensation was intact to light touch bilaterally. Facial expression was symmetric. Palate elevated symmetrically. Shoulder shrug was symmetric. Tongue protruded midline.     Motor: Normal muscle bulk and tone. Strength was 5/5 bilaterally for shoulder abduction, elbow flexion/extension,  strength, hip flexion, knee flexion/extension, ankle dorsi- and plantar flexion. There were no abnormal movements.     Sensory: Distal loss of pinprick in the feet, normal vibratory and proprioception in the toes.  Coordination: Finger to nose and heel to shin were intact with no dysmetria.     Reflexes: 1+ bilaterally in biceps, brachioradialis, patella, and absent in the achilles. Toes were downgoing bilaterally.    Gait: wide-based, unsteady, positive Romberg.  Physical Exam  I personally reviewed laboratory, radiographic, and medical studies which were pertinent for nothing.    Assessment/Plan

## 2023-12-20 NOTE — PATIENT INSTRUCTIONS
It was very nice meeting you two today.    Continue your activity level, have additional physical therapy, and improve your balance.  Try the memantine 5 mg twice a day, and call our office when you would like it increased to 10 mg twice  a day.  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  Have the blood work.  Follow up in 3 months.

## 2024-01-04 ENCOUNTER — LAB REQUISITION (OUTPATIENT)
Dept: LAB | Facility: HOSPITAL | Age: 79
End: 2024-01-04
Payer: MEDICARE

## 2024-01-04 LAB
CREAT SERPL-MCNC: 0.6 MG/DL (ref 0.6–1.3)
GFR SERPL CREATININE-BSD FRML MDRD: >60 ML/MIN/1.73M*2

## 2024-02-01 ENCOUNTER — OFFICE VISIT (OUTPATIENT)
Dept: OTOLARYNGOLOGY | Facility: CLINIC | Age: 79
End: 2024-02-01
Payer: MEDICARE

## 2024-02-01 VITALS — BODY MASS INDEX: 28.35 KG/M2 | TEMPERATURE: 97.1 F | HEIGHT: 62 IN

## 2024-02-01 DIAGNOSIS — H90.0 CONDUCTIVE HEARING LOSS, BILATERAL: ICD-10-CM

## 2024-02-01 DIAGNOSIS — H61.23 BILATERAL IMPACTED CERUMEN: Primary | ICD-10-CM

## 2024-02-01 PROCEDURE — 99203 OFFICE O/P NEW LOW 30 MIN: CPT | Performed by: OTOLARYNGOLOGY

## 2024-02-01 PROCEDURE — 69210 REMOVE IMPACTED EAR WAX UNI: CPT | Performed by: OTOLARYNGOLOGY

## 2024-02-01 PROCEDURE — 1160F RVW MEDS BY RX/DR IN RCRD: CPT | Performed by: OTOLARYNGOLOGY

## 2024-02-01 PROCEDURE — 1036F TOBACCO NON-USER: CPT | Performed by: OTOLARYNGOLOGY

## 2024-02-01 PROCEDURE — 1157F ADVNC CARE PLAN IN RCRD: CPT | Performed by: OTOLARYNGOLOGY

## 2024-02-01 PROCEDURE — 1159F MED LIST DOCD IN RCRD: CPT | Performed by: OTOLARYNGOLOGY

## 2024-02-01 PROCEDURE — 1125F AMNT PAIN NOTED PAIN PRSNT: CPT | Performed by: OTOLARYNGOLOGY

## 2024-02-01 NOTE — PROGRESS NOTES
"Chief Complaint   Patient presents with    New Patient Visit     NP H/O OTITIS EXTERNA, DEMENTIA, EAR CK , EARS WERE VERY SWOLLEN     HPI:  Vidhi Medrano is a 79 y.o. female complains of problems with some hearing loss over the past several months.  Some pressure and some discomfort as well.    PMH:  Past Medical History:   Diagnosis Date    COPD (chronic obstructive pulmonary disease) (CMS/HCC)     Dementia (CMS/HCC)     Diabetes (CMS/HCC)     Hypertension     Lung cancer (CMS/HCC)     TIA (transient ischemic attack)      Past Surgical History:   Procedure Laterality Date    CT GUIDED PERCUTANEOUS BIOPSY LUNG  06/06/2023    CT GUIDED PERCUTANEOUS BIOPSY LUNG 6/6/2023 [facility] CT    KNEE      MASTECTOMY      MR HEAD ANGIO WO IV CONTRAST  02/19/2023    MR HEAD ANGIO WO IV CONTRAST [facility]         Medications:     Current Outpatient Medications:     amLODIPine (Norvasc) 5 mg tablet, Take 1 tablet (5 mg) by mouth once daily., Disp: 30 tablet, Rfl: 3    aspirin 81 mg EC tablet, Chew., Disp: , Rfl:     atorvastatin (Lipitor) 40 mg tablet, Take 1 tablet (40 mg) by mouth once daily., Disp: 30 tablet, Rfl: 3    b complex vitamins capsule, Take 1 capsule by mouth once daily., Disp: , Rfl:     memantine (Namenda) 5 mg tablet, Take 1 tablet (5 mg) by mouth 2 times a day., Disp: 120 tablet, Rfl: 2    metFORMIN  mg 24 hr tablet, TAKE 1 TABLET DAILY WITH   THE EVENING MEAL, Disp: 90 tablet, Rfl: 1    acetaminophen (TylenoL) 325 mg tablet, Take 2 tablets (650 mg) by mouth every 4 hours if needed., Disp: , Rfl:     calcium carbonate-vitamin D3 500 mg-5 mcg (200 unit) tablet, Take 1,000 tablets by mouth once daily., Disp: , Rfl:     ipratropium-albuteroL (Combivent Respimat)  mcg/actuation inhaler, , Disp: , Rfl:      Allergies:  No Known Allergies     ROS:  Review of systems normal unless stated otherwise in the HPI and/or PMH.    Physical Exam:  Temperature 36.2 °C (97.1 °F), height 1.575 m (5' 2\"). Body " mass index is 28.35 kg/m².     GENERAL APPEARANCE: Well developed and well nourished.  Alert and oriented in no acute distress.  Normal vocal quality.      HEAD/FACE: No erythema or edema or facial tenderness.  Normal facial nerve function bilaterally.    EAR:       EXTERNAL: Normal pinnas and severe bilateral wax impactions removed with microscope, hook, suction, and alligators post failure flushing.       MIDDLE EAR: Tympanic membranes intact and mobile with normal landmarks.  Middle ear space appears well aerated.       TUBE STATUS: N/A       MASTOID CAVITY: N/A       HEARING: Gross hearing assessment is within normal limits.      NOSE:       VISUALIZED USING: Anterior rhinoscopy with headlight and nasal speculum.       DORSUM: Midline, nontraumatic appearance.       MUCOSA: Normal-appearing.       SECRETIONS: Normal.       SEPTUM: Midline and nonobstructing.       INFERIOR TURBINATES: Normal.       MIDDLE TURBINATES/MEATUS: N/A       BLEEDING: N/A         ORAL CAVITY/PHARYNX:       TEETH: Adequate dentition.       TONGUE: No mass or lesion.  Normal mobility.       FLOOR OF MOUTH: No mass or lesion.       PALATE: Normal hard palate, soft palate, and uvula.       OROPHARYNX: Normal without mass or lesion.       BUCCAL MUCOSA/GBS: Normal without mass or lesion.       LIPS: Normal.    LARYNX/HYPOPHARYNX/NASOPHARYNX: N/A    NECK: No palpable masses or abnormal adenopathy.  Trachea is midline.    THYROID: No thyromegaly or palpable nodule.    SALIVARY GLANDS: Normal bilateral parotid and submandibular glands by inspection and palpation.    TMJ's: Normal.    NEURO: Cranial nerve exam grossly normal bilaterally.       Assessment/Plan   Vidhi was seen today for new patient visit.  Diagnoses and all orders for this visit:  Bilateral impacted cerumen (Primary)  Conductive hearing loss, bilateral     Both ears cleaned of severely impacted wax which did significantly help.  If still having problems recommend  audiogram.  Follow up if symptoms worsen or fail to improve.     Jewel Bartholomew MD

## 2024-02-14 DIAGNOSIS — I10 PRIMARY HYPERTENSION: ICD-10-CM

## 2024-02-14 DIAGNOSIS — E78.2 MIXED HYPERLIPIDEMIA: ICD-10-CM

## 2024-02-14 RX ORDER — ATORVASTATIN CALCIUM 40 MG/1
40 TABLET, FILM COATED ORAL DAILY
Qty: 90 TABLET | Refills: 1 | Status: SHIPPED | OUTPATIENT
Start: 2024-02-14

## 2024-02-14 RX ORDER — AMLODIPINE BESYLATE 5 MG/1
5 TABLET ORAL DAILY
Qty: 90 TABLET | Refills: 1 | Status: SHIPPED | OUTPATIENT
Start: 2024-02-14

## 2024-02-19 DIAGNOSIS — B49 FUNGAL INFECTION OF LUNG: Primary | ICD-10-CM

## 2024-03-20 ENCOUNTER — OFFICE VISIT (OUTPATIENT)
Dept: NEUROLOGY | Facility: CLINIC | Age: 79
End: 2024-03-20
Payer: MEDICARE

## 2024-03-20 VITALS
DIASTOLIC BLOOD PRESSURE: 70 MMHG | HEART RATE: 75 BPM | BODY MASS INDEX: 28.35 KG/M2 | HEIGHT: 62 IN | SYSTOLIC BLOOD PRESSURE: 118 MMHG

## 2024-03-20 DIAGNOSIS — F03.90 DEMENTIA WITHOUT BEHAVIORAL DISTURBANCE (MULTI): ICD-10-CM

## 2024-03-20 PROCEDURE — 1160F RVW MEDS BY RX/DR IN RCRD: CPT | Performed by: PSYCHIATRY & NEUROLOGY

## 2024-03-20 PROCEDURE — 1036F TOBACCO NON-USER: CPT | Performed by: PSYCHIATRY & NEUROLOGY

## 2024-03-20 PROCEDURE — 1159F MED LIST DOCD IN RCRD: CPT | Performed by: PSYCHIATRY & NEUROLOGY

## 2024-03-20 PROCEDURE — 1157F ADVNC CARE PLAN IN RCRD: CPT | Performed by: PSYCHIATRY & NEUROLOGY

## 2024-03-20 PROCEDURE — 3074F SYST BP LT 130 MM HG: CPT | Performed by: PSYCHIATRY & NEUROLOGY

## 2024-03-20 PROCEDURE — 3078F DIAST BP <80 MM HG: CPT | Performed by: PSYCHIATRY & NEUROLOGY

## 2024-03-20 PROCEDURE — 99213 OFFICE O/P EST LOW 20 MIN: CPT | Performed by: PSYCHIATRY & NEUROLOGY

## 2024-03-20 RX ORDER — MEMANTINE HYDROCHLORIDE 5 MG/1
5 TABLET ORAL 2 TIMES DAILY
Qty: 180 TABLET | Refills: 3 | Status: SHIPPED | OUTPATIENT
Start: 2024-03-20 | End: 2024-06-18

## 2024-03-20 NOTE — PATIENT INSTRUCTIONS
As discussed, I think your Mom has mild dementia, most likely related to chemo and radiation she received for lung small cell CA in 2019.  This may well be slowly progressive.  I recommend follow up in 9 months (December, 2024).

## 2024-03-20 NOTE — PROGRESS NOTES
Subjective   Vidhi Medrano is a 79 y.o.   female.  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  At her last appointment, in 12/2023, a MMSE was scored at 20/30.  She has been on memantine 5 mg twice a day- she has improved slightly on the medication, wanted to stay on this dosing, asking for 90 day refill.  She has some limitations in performing ADLs.  Objective   Neurological Exam  Alert, appears interactive and pleasant.  Physical Exam  I personally reviewed laboratory, radiographic, and medical studies which were pertinent for nothing.    Assessment/Plan

## 2024-03-25 ENCOUNTER — OFFICE VISIT (OUTPATIENT)
Dept: PRIMARY CARE | Facility: CLINIC | Age: 79
End: 2024-03-25
Payer: MEDICARE

## 2024-03-25 VITALS
HEIGHT: 62 IN | DIASTOLIC BLOOD PRESSURE: 78 MMHG | TEMPERATURE: 98 F | WEIGHT: 152 LBS | BODY MASS INDEX: 27.97 KG/M2 | OXYGEN SATURATION: 96 % | HEART RATE: 76 BPM | SYSTOLIC BLOOD PRESSURE: 120 MMHG

## 2024-03-25 DIAGNOSIS — J44.9 CHRONIC OBSTRUCTIVE PULMONARY DISEASE, UNSPECIFIED COPD TYPE (MULTI): ICD-10-CM

## 2024-03-25 DIAGNOSIS — E11.9 TYPE 2 DIABETES MELLITUS WITHOUT COMPLICATION, WITHOUT LONG-TERM CURRENT USE OF INSULIN (MULTI): Primary | Chronic | ICD-10-CM

## 2024-03-25 DIAGNOSIS — E11.42 POLYNEUROPATHY DUE TO TYPE 2 DIABETES MELLITUS (MULTI): ICD-10-CM

## 2024-03-25 DIAGNOSIS — E78.2 MIXED HYPERLIPIDEMIA: Chronic | ICD-10-CM

## 2024-03-25 DIAGNOSIS — I10 PRIMARY HYPERTENSION: Chronic | ICD-10-CM

## 2024-03-25 DIAGNOSIS — C34.90 SMALL CELL CARCINOMA OF LUNG (MULTI): ICD-10-CM

## 2024-03-25 PROBLEM — S09.90XA INJURY OF HEAD: Status: RESOLVED | Noted: 2023-11-02 | Resolved: 2024-03-25

## 2024-03-25 PROBLEM — M54.30 SCIATICA: Status: RESOLVED | Noted: 2023-11-02 | Resolved: 2024-03-25

## 2024-03-25 PROBLEM — R04.2 HEMOPTYSIS: Status: RESOLVED | Noted: 2023-11-02 | Resolved: 2024-03-25

## 2024-03-25 PROBLEM — K20.90 ESOPHAGITIS: Status: RESOLVED | Noted: 2023-11-02 | Resolved: 2024-03-25

## 2024-03-25 PROBLEM — G45.9 TRANSIENT ISCHEMIC ATTACK: Status: RESOLVED | Noted: 2023-11-02 | Resolved: 2024-03-25

## 2024-03-25 PROBLEM — J43.8 OTHER EMPHYSEMA (MULTI): Chronic | Status: ACTIVE | Noted: 2023-11-02

## 2024-03-25 PROBLEM — R27.0 ATAXIA: Status: RESOLVED | Noted: 2023-11-02 | Resolved: 2024-03-25

## 2024-03-25 PROBLEM — M54.16 LUMBAR RADICULOPATHY: Status: RESOLVED | Noted: 2018-07-20 | Resolved: 2024-03-25

## 2024-03-25 PROBLEM — I95.1 ORTHOSTATIC HYPOTENSION: Status: RESOLVED | Noted: 2023-11-02 | Resolved: 2024-03-25

## 2024-03-25 PROBLEM — R47.01 APHASIA: Status: RESOLVED | Noted: 2023-11-02 | Resolved: 2024-03-25

## 2024-03-25 PROBLEM — R91.1 LUNG NODULE: Status: RESOLVED | Noted: 2023-11-02 | Resolved: 2024-03-25

## 2024-03-25 PROBLEM — S01.91XA LACERATION OF HEAD: Status: RESOLVED | Noted: 2023-11-02 | Resolved: 2024-03-25

## 2024-03-25 PROBLEM — G62.9 POLYNEUROPATHY: Status: RESOLVED | Noted: 2023-11-02 | Resolved: 2024-03-25

## 2024-03-25 PROBLEM — R00.1 BRADYCARDIA: Status: RESOLVED | Noted: 2023-11-02 | Resolved: 2024-03-25

## 2024-03-25 PROBLEM — R22.0 FACIAL SWELLING: Status: RESOLVED | Noted: 2023-11-02 | Resolved: 2024-03-25

## 2024-03-25 PROBLEM — I67.9 CEREBROVASCULAR DISEASE: Chronic | Status: ACTIVE | Noted: 2023-11-02

## 2024-03-25 PROBLEM — R55 SYNCOPE AND COLLAPSE: Status: RESOLVED | Noted: 2023-11-02 | Resolved: 2024-03-25

## 2024-03-25 PROBLEM — R42 DIZZINESS: Status: RESOLVED | Noted: 2023-11-02 | Resolved: 2024-03-25
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
## 2024-03-25 PROBLEM — I25.10 CORONARY ARTERIOSCLEROSIS: Chronic | Status: ACTIVE | Noted: 2023-11-02

## 2024-03-25 PROCEDURE — 1036F TOBACCO NON-USER: CPT | Performed by: FAMILY MEDICINE

## 2024-03-25 PROCEDURE — 1126F AMNT PAIN NOTED NONE PRSNT: CPT | Performed by: FAMILY MEDICINE

## 2024-03-25 PROCEDURE — 3074F SYST BP LT 130 MM HG: CPT | Performed by: FAMILY MEDICINE

## 2024-03-25 PROCEDURE — 3078F DIAST BP <80 MM HG: CPT | Performed by: FAMILY MEDICINE

## 2024-03-25 PROCEDURE — 1157F ADVNC CARE PLAN IN RCRD: CPT | Performed by: FAMILY MEDICINE

## 2024-03-25 PROCEDURE — 1159F MED LIST DOCD IN RCRD: CPT | Performed by: FAMILY MEDICINE

## 2024-03-25 PROCEDURE — 99214 OFFICE O/P EST MOD 30 MIN: CPT | Performed by: FAMILY MEDICINE

## 2024-03-25 PROCEDURE — 1160F RVW MEDS BY RX/DR IN RCRD: CPT | Performed by: FAMILY MEDICINE

## 2024-03-25 RX ORDER — METFORMIN HYDROCHLORIDE 500 MG/1
500 TABLET, EXTENDED RELEASE ORAL
Qty: 90 TABLET | Refills: 3 | Status: SHIPPED | OUTPATIENT
Start: 2024-03-25

## 2024-03-25 ASSESSMENT — PATIENT HEALTH QUESTIONNAIRE - PHQ9
SUM OF ALL RESPONSES TO PHQ9 QUESTIONS 1 AND 2: 0
1. LITTLE INTEREST OR PLEASURE IN DOING THINGS: NOT AT ALL
2. FEELING DOWN, DEPRESSED OR HOPELESS: NOT AT ALL

## 2024-03-25 ASSESSMENT — ENCOUNTER SYMPTOMS
LOSS OF SENSATION IN FEET: 0
DEPRESSION: 0
COUGH: 0
ABDOMINAL DISTENTION: 0
OCCASIONAL FEELINGS OF UNSTEADINESS: 0
PALPITATIONS: 0
SHORTNESS OF BREATH: 0

## 2024-03-25 ASSESSMENT — PAIN SCALES - GENERAL: PAINLEVEL: 0-NO PAIN

## 2024-03-25 NOTE — PROGRESS NOTES
"Subjective   Patient ID: Vidhi Medrano is a 79 y.o. female who presents for No chief complaint on file..    HPI   COPD:          78 year old female presents with c/o COPD F/U doing well and without complaints, controlled.   Hypertension:          Patient here for F/U. stable.   Hyperlipidemia:          c/o Follow Up stable, doing well and no complaints.   Diabetes Mellitus:          Follow Up DM 2.          The patient is complaining of nothing.          Hypoglycemic episodes are none recently.          Overall blood glucose levels are well controlled, a1c 5.7         The feet are asymptomatic.          Side effects of the medications include none.     Lung cancer :  stable sees onc, small cell, Dr Dimas, ct lung due may 2024  Dementia  sees dr kerr, on namenda    L knee pain with swelling, resolves with asa, no trauma  Review of Systems   Respiratory:  Negative for cough and shortness of breath.    Cardiovascular:  Negative for chest pain and palpitations.   Gastrointestinal:  Negative for abdominal distention.       Objective   /78 (BP Location: Left arm)   Pulse 76   Temp 36.7 °C (98 °F) (Temporal)   Ht 1.575 m (5' 2\")   Wt 68.9 kg (152 lb)   SpO2 96%   BMI 27.80 kg/m²     Physical Exam  Vitals reviewed.   Constitutional:       Appearance: Normal appearance.   Cardiovascular:      Rate and Rhythm: Normal rate and regular rhythm.      Heart sounds: Normal heart sounds. No murmur heard.  Pulmonary:      Breath sounds: Normal breath sounds.   Abdominal:      General: Abdomen is flat. Bowel sounds are normal.      Palpations: Abdomen is soft.   Musculoskeletal:         General: No swelling.   Neurological:      Mental Status: She is alert.         Assessment/Plan   Diagnoses and all orders for this visit:  Type 2 diabetes mellitus without complication, without long-term current use of insulin (CMS/[facility])  -     metFORMIN  mg 24 hr tablet; Take 1 tablet (500 mg) by mouth once daily in the " evening. Take with meals.  Polyneuropathy due to type 2 diabetes mellitus (CMS/HCC)  Small cell carcinoma of lung (CMS/HCC)  Chronic obstructive pulmonary disease, unspecified COPD type (CMS/HCC)  Primary hypertension  Mixed hyperlipidemia    Cont meds  Rto 3mo

## 2024-05-03 ENCOUNTER — HOSPITAL ENCOUNTER (OUTPATIENT)
Dept: RADIOLOGY | Facility: HOSPITAL | Age: 79
Discharge: HOME | End: 2024-05-03
Payer: MEDICARE

## 2024-05-03 DIAGNOSIS — C34.90 SMALL CELL CARCINOMA OF LUNG (MULTI): ICD-10-CM

## 2024-05-03 LAB
CREAT SERPL-MCNC: 0.7 MG/DL (ref 0.6–1.3)
GFR SERPL CREATININE-BSD FRML MDRD: 87 ML/MIN/1.73M*2

## 2024-05-03 PROCEDURE — 2550000001 HC RX 255 CONTRASTS: Performed by: INTERNAL MEDICINE

## 2024-05-03 PROCEDURE — 71260 CT THORAX DX C+: CPT | Performed by: RADIOLOGY

## 2024-05-03 PROCEDURE — 71260 CT THORAX DX C+: CPT

## 2024-05-03 PROCEDURE — 82565 ASSAY OF CREATININE: CPT

## 2024-05-03 RX ADMIN — IOHEXOL 50 ML: 350 INJECTION, SOLUTION INTRAVENOUS at 17:41

## 2024-05-09 ENCOUNTER — LAB (OUTPATIENT)
Dept: LAB | Facility: LAB | Age: 79
End: 2024-05-09
Payer: MEDICARE

## 2024-05-09 ENCOUNTER — OFFICE VISIT (OUTPATIENT)
Dept: HEMATOLOGY/ONCOLOGY | Facility: CLINIC | Age: 79
End: 2024-05-09
Payer: MEDICARE

## 2024-05-09 VITALS
DIASTOLIC BLOOD PRESSURE: 71 MMHG | HEART RATE: 60 BPM | SYSTOLIC BLOOD PRESSURE: 133 MMHG | RESPIRATION RATE: 17 BRPM | TEMPERATURE: 98.2 F | BODY MASS INDEX: 28.29 KG/M2 | WEIGHT: 154.65 LBS | OXYGEN SATURATION: 97 %

## 2024-05-09 DIAGNOSIS — E55.9 VITAMIN D DEFICIENCY: ICD-10-CM

## 2024-05-09 DIAGNOSIS — C34.90 SMALL CELL CARCINOMA OF LUNG (MULTI): ICD-10-CM

## 2024-05-09 DIAGNOSIS — C34.90 SMALL CELL CARCINOMA OF LUNG (MULTI): Primary | ICD-10-CM

## 2024-05-09 DIAGNOSIS — E11.42 POLYNEUROPATHY DUE TO TYPE 2 DIABETES MELLITUS (MULTI): ICD-10-CM

## 2024-05-09 DIAGNOSIS — B49 FUNGAL INFECTION OF LUNG: ICD-10-CM

## 2024-05-09 DIAGNOSIS — F03.90 DEMENTIA WITHOUT BEHAVIORAL DISTURBANCE (MULTI): ICD-10-CM

## 2024-05-09 DIAGNOSIS — C34.31 MALIGNANT NEOPLASM OF LOWER LOBE, RIGHT BRONCHUS OR LUNG (MULTI): ICD-10-CM

## 2024-05-09 LAB
ALBUMIN SERPL BCP-MCNC: 4 G/DL (ref 3.4–5)
ALP SERPL-CCNC: 124 U/L (ref 33–136)
ALT SERPL W P-5'-P-CCNC: 18 U/L (ref 7–45)
ANION GAP SERPL CALC-SCNC: 13 MMOL/L (ref 10–20)
AST SERPL W P-5'-P-CCNC: 19 U/L (ref 9–39)
BASOPHILS # BLD AUTO: 0.04 X10*3/UL (ref 0–0.1)
BASOPHILS NFR BLD AUTO: 0.7 %
BILIRUB SERPL-MCNC: 0.7 MG/DL (ref 0–1.2)
BUN SERPL-MCNC: 16 MG/DL (ref 6–23)
CALCIUM SERPL-MCNC: 8.9 MG/DL (ref 8.6–10.3)
CHLORIDE SERPL-SCNC: 106 MMOL/L (ref 98–107)
CO2 SERPL-SCNC: 26 MMOL/L (ref 21–32)
CREAT SERPL-MCNC: 0.98 MG/DL (ref 0.5–1.05)
EGFRCR SERPLBLD CKD-EPI 2021: 58 ML/MIN/1.73M*2
EOSINOPHIL # BLD AUTO: 0.19 X10*3/UL (ref 0–0.4)
EOSINOPHIL NFR BLD AUTO: 3.2 %
ERYTHROCYTE [DISTWIDTH] IN BLOOD BY AUTOMATED COUNT: 14 % (ref 11.5–14.5)
GLUCOSE SERPL-MCNC: 201 MG/DL (ref 74–99)
HCT VFR BLD AUTO: 36.8 % (ref 36–46)
HGB BLD-MCNC: 11.7 G/DL (ref 12–16)
IMM GRANULOCYTES # BLD AUTO: 0.04 X10*3/UL (ref 0–0.5)
IMM GRANULOCYTES NFR BLD AUTO: 0.7 % (ref 0–0.9)
LYMPHOCYTES # BLD AUTO: 1.35 X10*3/UL (ref 0.8–3)
LYMPHOCYTES NFR BLD AUTO: 23.1 %
MCH RBC QN AUTO: 30.1 PG (ref 26–34)
MCHC RBC AUTO-ENTMCNC: 31.8 G/DL (ref 32–36)
MCV RBC AUTO: 95 FL (ref 80–100)
MONOCYTES # BLD AUTO: 0.54 X10*3/UL (ref 0.05–0.8)
MONOCYTES NFR BLD AUTO: 9.2 %
NEUTROPHILS # BLD AUTO: 3.69 X10*3/UL (ref 1.6–5.5)
NEUTROPHILS NFR BLD AUTO: 63.1 %
NRBC BLD-RTO: 0 /100 WBCS (ref 0–0)
PLATELET # BLD AUTO: 210 X10*3/UL (ref 150–450)
POTASSIUM SERPL-SCNC: 3.9 MMOL/L (ref 3.5–5.3)
PROT SERPL-MCNC: 6.5 G/DL (ref 6.4–8.2)
RBC # BLD AUTO: 3.89 X10*6/UL (ref 4–5.2)
RBC MORPH BLD: NORMAL
SODIUM SERPL-SCNC: 141 MMOL/L (ref 136–145)
T4 FREE SERPL-MCNC: 0.54 NG/DL (ref 0.61–1.12)
TSH SERPL-ACNC: 5.48 MIU/L (ref 0.44–3.98)
WBC # BLD AUTO: 5.9 X10*3/UL (ref 4.4–11.3)

## 2024-05-09 PROCEDURE — 84443 ASSAY THYROID STIM HORMONE: CPT

## 2024-05-09 PROCEDURE — 1157F ADVNC CARE PLAN IN RCRD: CPT | Performed by: INTERNAL MEDICINE

## 2024-05-09 PROCEDURE — 36415 COLL VENOUS BLD VENIPUNCTURE: CPT

## 2024-05-09 PROCEDURE — 1159F MED LIST DOCD IN RCRD: CPT | Performed by: INTERNAL MEDICINE

## 2024-05-09 PROCEDURE — 99213 OFFICE O/P EST LOW 20 MIN: CPT | Performed by: INTERNAL MEDICINE

## 2024-05-09 PROCEDURE — 84439 ASSAY OF FREE THYROXINE: CPT

## 2024-05-09 PROCEDURE — 80053 COMPREHEN METABOLIC PANEL: CPT

## 2024-05-09 PROCEDURE — 3078F DIAST BP <80 MM HG: CPT | Performed by: INTERNAL MEDICINE

## 2024-05-09 PROCEDURE — 85025 COMPLETE CBC W/AUTO DIFF WBC: CPT

## 2024-05-09 PROCEDURE — 1126F AMNT PAIN NOTED NONE PRSNT: CPT | Performed by: INTERNAL MEDICINE

## 2024-05-09 PROCEDURE — 3075F SYST BP GE 130 - 139MM HG: CPT | Performed by: INTERNAL MEDICINE

## 2024-05-09 PROCEDURE — 1160F RVW MEDS BY RX/DR IN RCRD: CPT | Performed by: INTERNAL MEDICINE

## 2024-05-09 PROCEDURE — 82607 VITAMIN B-12: CPT

## 2024-05-09 PROCEDURE — 82306 VITAMIN D 25 HYDROXY: CPT

## 2024-05-09 ASSESSMENT — PATIENT HEALTH QUESTIONNAIRE - PHQ9
SUM OF ALL RESPONSES TO PHQ9 QUESTIONS 1 AND 2: 0
2. FEELING DOWN, DEPRESSED OR HOPELESS: NOT AT ALL
1. LITTLE INTEREST OR PLEASURE IN DOING THINGS: NOT AT ALL

## 2024-05-09 ASSESSMENT — COLUMBIA-SUICIDE SEVERITY RATING SCALE - C-SSRS
1. IN THE PAST MONTH, HAVE YOU WISHED YOU WERE DEAD OR WISHED YOU COULD GO TO SLEEP AND NOT WAKE UP?: NO
6. HAVE YOU EVER DONE ANYTHING, STARTED TO DO ANYTHING, OR PREPARED TO DO ANYTHING TO END YOUR LIFE?: NO
2. HAVE YOU ACTUALLY HAD ANY THOUGHTS OF KILLING YOURSELF?: NO

## 2024-05-09 ASSESSMENT — PAIN SCALES - GENERAL: PAINLEVEL: 0-NO PAIN

## 2024-05-09 NOTE — PATIENT INSTRUCTIONS
Today you met with your hematologist/oncologist.  Recent labs were discussed and questions answered.  Scheduling orders were placed.  While we appreciate that you verbalized understanding, if any questions arise after leaving, please do not hesitate to call the office to discuss.  [phone] Juan Pablo Recinos.  Dr Pizarro would like you to return to the clinic in one year. You will see Reina, the physician assistant that day. Please have labs drawn a week prior to that appointment and get a CT scan. That has been ordered for you.

## 2024-05-09 NOTE — PROGRESS NOTES
Patient ID: Vidhi Medrano is a 79 y.o. female.  Referring Physician: Stanford Pizarro MD  [id number]  [place] [id number]  Primary Care Provider: Vineet Craig MD      Subjective    HPI    Chief Complaint: follow up lung cancer   Interval History:    Patient of Dr. White and Reina Zaman, DORIS      Reason for visit: Right Lung nodule , hx of small cell lung cancer      HPI      78 year old woman with  hx of limited stage small cell lung cancer diagnosed June 2018 s/p definitive chemoxrt with carboplatin/Etoposide followed by PCI, cTxN2  presented with only hyper metabolic active mediastinal nodes and no lung mass , HTN, HLD, DM, CAD s/p MI, TIA, peripheral neuropathy, smoking, COPD, OA s/p left TKA, R breast cancer originally diagnosed  in 2011 s/p R mastectomy ER/CT positive, pT1cN0, 1.1 cm tumor, grade 1, s/p adjuvant Arimidex until her lung cancer diagnosis,  who was following for surveillance of her cancer      she had a CT screening in 2018 that showed an enlarged prevascular LN   PET scan showed uptake in the LN and findings suggestive of adrenal adenoma  brain MRI showed single 6.5 contrast enhancement area overlying the left frontal lobe non specific   treated with concurrent chemoradiation followed by PCI   I also see a report of a mediastinal lymph node biopsy in Nov 2018 that showed findings suggestive of CD10+ lymphoproliferative disease, not clear the circumstances of biopsy from available records     She was on surveillance with CT scans   CT chest April 2021: RLL calcified granuloma , no other susupicious nodules, mildly prominent subcarinal LN   CT scan of chest Oct 2021: small nodule AZUCENA measuring 4 mm, stable   Ct scan Nov 2022: stable tiny b/l nodules, new AZUCENA 3 mm nodule looks suspicious, 8 x 6 mm infiltrative nodule in AZUCENA could be slowly growing tumor   CT scan March 2023: increased nodule in AZUCENA 9 x 8 mm previously 5 x 4 mm (?comparison CT) , new 4 mm RUL  nodule  PET scan 5/9/23: intense metabolic activity in AZUCENA nodule SUV 6.1, non specific activity in area of femoral head   Bone scan 5/26: no abnormal radiotracer uptake   6/6/23 bx of AZUCENA nodule: Alveloar lung parenchyma, non diagnostic, limited tissue      she denies major change in breathing   no increased SOB or COLINDRES   she can walk a bit but not too long   she feels weakness in her legs   this has worsened over the last year or so   now only can do some walking   hx of TIA in Feb 2023  had some falls   no headaches or back pain   gets lightheaded   has memory changes   no increased cough   no oxygen requirements   eating and drinking well, it is stable now      interval history- 5/9/24     She is doing well in general   Breathing at baseline     Continues to have intermittent falls, she usually uses a walker  She has seen neurology for her cognitive decline, and was started on memantine  No respiratory symptoms at this time, no increased cough or SOB   Eating and drinking well, her weight is maintained  no nausea, vomiting     Denies any fever, infections        PAST MEDICAL HISTORY:   hx of limited stage small cell lung cancer diagnosed June 2018 s/p definitive chemoxrt with carboplatin/Etoposide followed by PCI , cTxN2 presented with only hyper metabolic active mediastinal nodes and no lung mass , HTN, HLD, DM, CAD s/p MI, TIA, peripheral neuropathy, smoking, COPD, OA s/p left TKA, R breast cancer originally diagnosed in 2011 s/p R mastectomy     SOCIAL HISTORY:   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     REVIEW OF SYSTEMS:  Pertinent finding as per the history above.  There are no additional specific symptoms pertaining to eyes, ENT, hematologic,  lymphatic, neurological, psychiatric, cardiac,  pulmonary, GI, , endocrine, rheumatic, dermatological, or musculoskeletal systems.  All other systems have been reviewed and generally negative and noncontributory.     PHYSICAL EXAMINATION:    GENERAL:  Age-appropriate, in no acute discomfort.    VITAL SIGNS:  Reviewed in the EMR and were noted to be stable.    HEENT:  Normocephalic and atraumatic.  Mucous membranes are moist. No oral lesions.   NECK:  Supple without lymphadenopathy.  No thyromegaly or bruits.    CHEST:  Clear to auscultation bilaterally  HEART:  Regular in rate and rhythm.  No gallop, rub, or murmur.    ABDOMEN:  Soft, nontender, and nondistended. No hepatosplenomegaly.    EXTREMITIES:  No cyanosis, clubbing, mild edema   NEUROLOGICAL:  Alert, awake, and oriented.  b/l LE weakness R>L , in wheelchair   LYMPHATICS: No significant lymphadenopathy.     LAB DATA:  Latest labs were reviewed in the EMR and from the outside sources.    Objective   BSA: 1.75 meters squared  /71 (BP Location: Right arm, Patient Position: Sitting, BP Cuff Size: Adult)   Pulse 60   Temp 36.8 °C (98.2 °F) (Temporal)   Resp 17   Wt 70.1 kg (154 lb 10.4 oz)   SpO2 97%   BMI 28.29 kg/m²     Wt Readings from Last 3 Encounters:   05/09/24 70.1 kg (154 lb 10.4 oz)   03/25/24 68.9 kg (152 lb)   12/20/23 70.3 kg (155 lb)   ]  No family history on file.  Oncology History    No history exists.       Vidhi Medrano  reports that she quit smoking about 3 years ago. Her smoking use included cigarettes. She has never used smokeless tobacco.  She  reports current alcohol use.  She  reports no history of drug use.    Results     FINAL DIAGNOSIS   LEFT LUNG BIOPSY:   --ALVEOLATED LUNG PARENCHYMA WITH NO SIGNIFICANT DIAGNOSTIC FINDINGS. SEE   NOTE.     Note:  There is limited tissue present for evaluation. Deeper levels were   attempted, however, no additional tissue is identified block. No mass lesion   is identified in this  "biopsy specimen. Rebiopsy is recommended as clinically   indicated.        Electronically Signed Out By TAE LANTIGUA MD/HEDY   By the signature on this report, the individual or group listed as making the   Final Interpretation/Diagnosis certifies that they  have reviewed this case.   Diagnostic interpretation performed at [facility] [address] [place] [id number]       Clinical History:   Left lung mass     Specimens Submitted As:   A: LEFT LUNG BIOPSY      Gross Description:   Received in formalin, labeled with the patient´s name and hospital number and   \"A, left lung biopsy\", are multiple minute pink-white, soft tissue fragments   aggregating to 1.5 x 0.1 x 0.1 cm. The specimen is submitted  in toto in one   cassette. The specimen may not survive processing.   [facility]   Department of Pathology   [address] [place] [address]     =========================== Next Report ===========================     Cytology-Non GYN [Jul 26 2023 12:11PM]  ([id number])    Specimens: BRONCHIAL BRUSH OF LEFT UPPER LOBE NODULE /FINE NEEDLE ASPIRATION LUNG - LEFT UPPER LOBE NODULE /BRONCHO-ALVEOLAR  LAVAGE OF LEFT UPPER LOBE /FINE NEEDLE ASPIRATION 11L LYMPH NODE /BRONCHIAL BRUSH OF LEFT UPPER LOBE NODULE /FINE NEEDLE ASPIRATION LUNG - LEFT UPPER LOBE NODULE /BRONCHO-ALVEOLAR LAVAGE OF LEFT UPPER LOBE /FINE NEEDLE ASPIRATION 11L LYMPH NODE        MRN: [id number]   Patient Name GURPREET SOLOMON   Accession #: [id number]   Date of Procedure: 7/24/2023   Date Reported: 7/26/2023   Date Received: 7/24/2023   Date of Birth / Sex 1945 (Age: 78) / F   Race: WHITE    Submitting Physician: GEOVANY ANDERSON MD   Attending Physician: CARLOS GROSS M.D.       Other External#     FINAL CYTOLOGICAL INTERPRETATION     A. BRONCHIAL BRUSH OF LEFT UPPER LOBE NODULE, CYTOLOGY AND CELL BLOCK:    --BLOOD AND RARE BRONCHIAL CELLS ONLY; NON-DIAGNOSTIC.     B. FINE " NEEDLE ASPIRATION LUNG - LEFT UPPER LOBE NODULE, CYTOLOGY AND CELL   BLOCK:   --NON-DIAGNOSTIC SPECIMEN; BLOOD ONLY.     C. BRONCHO-ALVEOLAR LAVAGE OF LEFT UPPER  LOBE:   --NO MALIGNANT CELLS IDENTIFIED.     D. FINE NEEDLE ASPIRATION 11L LYMPH NODE, CYTOLOGY AND CELL BLOCK:   --NO MALIGNANT CELLS IDENTIFIED. LIMITED LYMPHOID SPECIMEN.     The gross and/or microscopic findings were reviewed  in conjunction with   pathology resident, Alan Martin MD.         Slide(s) initially screened by a Cytotechnologist at [address], [place] [address]        Electronically Signed Out By Job Chang MD     By the signature on this report, the individual or group listed as making the   Final Interpretation/Diagnosis certifies that they have reviewedthis case.   Slide(s) initially screened  by a Cytotechnologist at [facility]   Diagnostic interpretation performed at [address] [place] [address]       Rapid Evaluation       Brushing Immediate Read Result:   A. Non-diagnostic,  specimen consists of bronchial cells.     Fine Needle Aspiration Immediate Read Result:   B. Non-diagnostic, almost entirely blood.   D. Lymphoid, negative.     Pathologist: Anton Gibson M.D. Date: 7.24.2023     Clinical  History       GROWING 9MM PET LEFT UPPER LOBE NODULE; HISTORY OF LIMITED STAGE SMALL CELL   LUNG CANCER.   Source of Specimen   A: BRONCHIAL BRUSH OF LEFT UPPER LOBE NODULE   B: FINE NEEDLE ASPIRATION LUNG - LEFT UPPER LOBE NODULE    C: BRONCHO-ALVEOLAR LAVAGE OF LEFT UPPER LOBE   D: FINE NEEDLE ASPIRATION 11L LYMPH NODE     Specimen Submitted as:   A: BRONCHIAL BRUSH OF LEFT UPPER LOBE NODULE   Pap stain Non-Gyn, Pap stain Non-Gyn, Diff-Quik stain Non-Gyn, Diff-Quik    stain Non-Gyn, CELL BLOCK, H&E, Initial   B: FINE NEEDLE ASPIRATION LUNG - LEFT UPPER LOBE NODULE   Pap stain Non-Gyn, Pap stain Non-Gyn, Diff-Quik stain  Non-Gyn, Diff-Quik   stain Non-Gyn, CELL BLOCK, H&E, Initial   C: BRONCHO-ALVEOLAR  LAVAGE OF LEFT UPPER LOBE   Pap non-gyn ThinPrep slide   D: FINE NEEDLE ASPIRATION 11L LYMPH NODE   Pap stain Non-Gyn, Pap stain Non-Gyn, Diff-Quik stain Non-Gyn, Diff-Quik   stain Non-Gyn, CELL BLOCK, H&E, Initial     Gross Description    A. BRONCHIAL BRUSH OF LEFT UPPER LOBE NODULE: RECEIVED 4 DIRECT SMEARS (2   AIR-DRIED DIFF-QUIK AND 2 SPRAY-FIXED) AND 30cc OF PINK CLEAR NEEDLE RINSE IN   CYTOLYT WITH FEW PARTICLES AND 2 BRUSHES.     B. FINE NEEDLE ASPIRATION LUNG - LEFT  UPPER LOBE NODULE: RECEIVED 4 DIRECT   SMEARS (2AIR-DRIED DIFF-QUIK AND 2 SPRAY-FIXED) AND 30cc OF RED CLEAR NEEDLE   RINSE IN CYTOLYT WITH PARTICLES.     C. BRONCHO-ALVEOLAR LAVAGE OF LEFT UPPER LOBE: RECEIVED 10cc OF FRESH PINK    CLOUDY FLUID WITH PARTICLES.     D. FINE NEEDLE ASPIRATION 11L LYMPH NODE: RECEIVED 4 DIRECT SMEARS (2   AIR-DRIED DIFF-QUIK AND 2 SPRAY-FIXED) AND 40cc OF RED CLEAR NEEDLE RINSE IN   CYTOLYT WITH PARTICLES.              [facility]   Department of Pathology   [address] [place] [address]     =========================== Next Report ===========================      Surgical Pathology [Jul 26 2023 5:03PM] ([id number])    Specimens: AZUCENA NODULE, TBBX     Name GURPREET SOLOMON     Accession #: [id number]   Pathologist: SHAMIR PHILLIPS MD   Date of Procedure: 7/24/2023   Date Received: 7/24/2023   Date Reported 7/26/2023   Submitting Physician: CARLOS GROSS M.D.   Location: [place] Other  External #             FINAL DIAGNOSIS   A. LEFT LUNG, UPPER LOBE NODULE, TRANSBRONCHIAL BIOPSY:   -- SMALL FRAGMENT OF DENSE FIBROUS TISSUE AND BLOOD CLOT; SEE NOTE.   -- MULTIPLE LEVELS EXAMINED FOR FINAL DIAGNOSIS.      NOTE: There is no evidence of a mass forming lesion in this limited biopsy.   Correlation with clinical and radiologic features is needed for further   evaluation. Re-biopsy  may be considered, if clinically warranted.      CT chest w IV contrast    Result Date: 10/31/2023  Interpreted By:  Sofia Corrales and Marshall Colin STUDY: CT CHEST W IV CONTRAST;  10/30/2023 4:11 pm   INDICATION: Signs/Symptoms: restaging  C34.31: Malignant neoplasm of lower lobe, right bronchus or lung C50.911: Malignant neoplasm of right breast.   COMPARISON: CT chest dated 07/24/2023, 03/27/2023 and 11/30/2022.   ACCESSION NUMBER(S): [id number]   ORDERING CLINICIAN: CARLOS GROSS   TECHNIQUE: Helical data acquisition of the chest was obtained  after intravenous administration of 75 mL Omnipaque 350. Images were reformatted in axial, coronal, and sagittal planes.   FINDINGS: LUNGS AND AIRWAYS: The trachea and central airways are patent. No endobronchial lesion.   There is interval decreased prominence of irregular morphology nodule in the left upper lobe which measures up to 9 mm as seen on image 95 when compared with the previous CT chest studies from 07/24/2023. An adjacent curvilinear structure in the anteroinferior aspect of this nodule in the left upper lobe appears similar   Stable size of a 0.3 cm left lower lobe pulmonary nodule (image 129). No new or enlarging discrete suspicious pulmonary nodules.   No focal consolidation, pleural effusion, or pneumothorax.   MEDIASTINUM AND ABHIJEET, LOWER NECK AND AXILLA: The visualized thyroid gland is within normal limits.   No evidence of thoracic lymphadenopathy by CT criteria.   Esophagus appears within normal limits as seen.   HEART AND VESSELS: The thoracic aorta is of normal course and caliber with severe vascular calcifications.   Main pulmonary artery and its branches are normal in caliber.   Mild coronary artery calcifications are seen. The study is not optimized for evaluation of coronary arteries.   The cardiac chambers are not enlarged.   No evidence of pericardial effusion.   UPPER ABDOMEN: Unchanged appearance of low-density adrenal nodules  measuring up to 1.7 cm on the left and 0.9 cm on the right. Unchanged appearance of simple fluid attenuating cysts within the right kidney measuring up to 1.3 cm in diameter. Otherwise, the partially visualized subdiaphragmatic structures are unremarkable in appearance.   CHEST WALL AND OSSEOUS STRUCTURES: Postoperative changes of right mastectomy again noted and unremarkable in appearance. The chest wall soft tissues are otherwise unremarkable in appearance. No acute osseous abnormalities or suspicious osseous lesions.       1.  There is interval decreased prominence of irregular morphology nodule in the left upper lobe which measures up to 9 mm when compared with the previous CT chest study from 07/24/2023. This may represent inflammatory nodule. Continued follow-up is suggested until resolution. An adjacent curvilinear structure in the anteroinferior aspect of this nodule in the left upper lobe appears similar. 2. Additional subcentimeter pulmonary nodules are stable in size as compared with prior exam and described above. No new or enlarging discrete suspicious pulmonary nodules. 3. No acute cardiopulmonary process.     I personally reviewed the images/study and I agree with the resident findings as stated. This study was interpreted at [place], [place].   Signed by: Sofia Corrales 10/31/2023 10:58 PM Dictation workstation:   [id number]     Assessment/Plan      1. History of limited stage small cell lung cancer      hx if limited stage SCLC s/p definitive chemoxrt with carbo/etoposide/ xrt in 2018   followed by PCI   now with a new AZUCENA lung nodule with FDG avidity   non diagnostic biopsy      we discussed again to refer for another bx transcutaneous or via navigational bronchoscopy   she wants to think about it   alternative include evaluation for treatment due to high suspicion and referral to radiation oncology to check eligibility for sbrt given prior  radiation, also wants to wait and think about it      will check a brain MRI in meantime given neurologic symptoms      8/1- MRI brain was negative   referred for EBUS and bx, performed with Dr. Fitch, lung nodule was small and less solid on navigational bronc scan, path showed no malignancy   will plan to repeat a scan in 3 months, but will review imaging on TB if there is any suggestion to treat regardless of biopsy vs surveillance imaging     11/3/2023-  Case was reviewed on the thoracic tumor board and recommended for a repeat CT scan of the chest  The repeat CT scan was done on 10/30/2023 and is reassuring, lung nodule of concern has decreased in the size and there are other stable lung nodules  At this time there is no evidence of malignancy, we will plan to repeat another CT scan of the chest in 6 months and perhaps afterwards she can go to yearly low-dose CT screening if there are no concerning findings    Noted on cultures from the bronchoscopy there was both staph and the mold, she stated she did not take antibiotics or antifungals, but she is pretty much asymptomatic, will refer to infectious disease to determine if any treatment is needed at all    5/9/2024  Patient presents today for surveillance visit  CT scan of the chest showed resolution of the previous lung nodule of concern, there was other stable findings  At this time she remains with no evidence of disease  She has completed 5+ years of surveillance  Will move at this time to a yearly CT scan, continue neurology follow-up for her memory decline  She was not able to see infectious disease, but at this time she is asymptomatic from pulmonary standpoint, and she wants to defer seeing either pulmonary or ID, we discussed potential infection/pneumonia symptoms     2. hx of breast cancer   s/p right mastectomy   completed arimidex adjuvant endocrine therapy      RTC 1 year with a CT scan    Stanford Pizarro MD

## 2024-05-10 ENCOUNTER — APPOINTMENT (OUTPATIENT)
Dept: HEMATOLOGY/ONCOLOGY | Facility: CLINIC | Age: 79
End: 2024-05-10
Payer: MEDICARE

## 2024-05-10 LAB
25(OH)D3 SERPL-MCNC: 49 NG/ML (ref 30–100)
VIT B12 SERPL-MCNC: 598 PG/ML (ref 211–911)

## 2024-08-12 DIAGNOSIS — E78.2 MIXED HYPERLIPIDEMIA: ICD-10-CM

## 2024-08-12 DIAGNOSIS — I10 PRIMARY HYPERTENSION: ICD-10-CM

## 2024-08-12 RX ORDER — ATORVASTATIN CALCIUM 40 MG/1
40 TABLET, FILM COATED ORAL DAILY
Qty: 90 TABLET | Refills: 1 | Status: SHIPPED | OUTPATIENT
Start: 2024-08-12

## 2024-08-12 RX ORDER — AMLODIPINE BESYLATE 5 MG/1
5 TABLET ORAL DAILY
Qty: 90 TABLET | Refills: 1 | Status: SHIPPED | OUTPATIENT
Start: 2024-08-12

## 2024-08-21 ENCOUNTER — OFFICE VISIT (OUTPATIENT)
Dept: PRIMARY CARE | Facility: CLINIC | Age: 79
End: 2024-08-21
Payer: MEDICARE

## 2024-08-21 VITALS
SYSTOLIC BLOOD PRESSURE: 130 MMHG | HEART RATE: 67 BPM | BODY MASS INDEX: 28.01 KG/M2 | DIASTOLIC BLOOD PRESSURE: 70 MMHG | WEIGHT: 152.2 LBS | HEIGHT: 62 IN | OXYGEN SATURATION: 98 % | TEMPERATURE: 98.5 F

## 2024-08-21 DIAGNOSIS — N18.31 TYPE 2 DIABETES MELLITUS WITH STAGE 3A CHRONIC KIDNEY DISEASE, WITHOUT LONG-TERM CURRENT USE OF INSULIN (MULTI): Primary | ICD-10-CM

## 2024-08-21 DIAGNOSIS — E11.22 TYPE 2 DIABETES MELLITUS WITH STAGE 3A CHRONIC KIDNEY DISEASE, WITHOUT LONG-TERM CURRENT USE OF INSULIN (MULTI): Primary | ICD-10-CM

## 2024-08-21 DIAGNOSIS — N18.31 STAGE 3A CHRONIC KIDNEY DISEASE (MULTI): ICD-10-CM

## 2024-08-21 DIAGNOSIS — J43.8 OTHER EMPHYSEMA (MULTI): Chronic | ICD-10-CM

## 2024-08-21 DIAGNOSIS — C34.90 SMALL CELL CARCINOMA OF LUNG (MULTI): ICD-10-CM

## 2024-08-21 DIAGNOSIS — I10 PRIMARY HYPERTENSION: Chronic | ICD-10-CM

## 2024-08-21 DIAGNOSIS — E78.2 MIXED HYPERLIPIDEMIA: Chronic | ICD-10-CM

## 2024-08-21 DIAGNOSIS — E03.9 ACQUIRED HYPOTHYROIDISM: ICD-10-CM

## 2024-08-21 PROBLEM — E11.42 POLYNEUROPATHY DUE TO TYPE 2 DIABETES MELLITUS (MULTI): Chronic | Status: ACTIVE | Noted: 2023-12-20

## 2024-08-21 PROBLEM — C50.919 MALIGNANT NEOPLASM OF FEMALE BREAST (MULTI): Chronic | Status: ACTIVE | Noted: 2023-11-02

## 2024-08-21 PROBLEM — F03.90 DEMENTIA WITHOUT BEHAVIORAL DISTURBANCE (MULTI): Chronic | Status: ACTIVE | Noted: 2023-11-02

## 2024-08-21 PROCEDURE — 1123F ACP DISCUSS/DSCN MKR DOCD: CPT | Performed by: FAMILY MEDICINE

## 2024-08-21 PROCEDURE — 83036 HEMOGLOBIN GLYCOSYLATED A1C: CPT | Mod: QW | Performed by: FAMILY MEDICINE

## 2024-08-21 PROCEDURE — 3078F DIAST BP <80 MM HG: CPT | Performed by: FAMILY MEDICINE

## 2024-08-21 PROCEDURE — 1036F TOBACCO NON-USER: CPT | Performed by: FAMILY MEDICINE

## 2024-08-21 PROCEDURE — 1159F MED LIST DOCD IN RCRD: CPT | Performed by: FAMILY MEDICINE

## 2024-08-21 PROCEDURE — 1158F ADVNC CARE PLAN TLK DOCD: CPT | Performed by: FAMILY MEDICINE

## 2024-08-21 PROCEDURE — 3075F SYST BP GE 130 - 139MM HG: CPT | Performed by: FAMILY MEDICINE

## 2024-08-21 PROCEDURE — 99214 OFFICE O/P EST MOD 30 MIN: CPT | Performed by: FAMILY MEDICINE

## 2024-08-21 RX ORDER — CETIRIZINE HYDROCHLORIDE 10 MG/1
10 TABLET ORAL DAILY
COMMUNITY

## 2024-08-21 RX ORDER — LEVOTHYROXINE SODIUM 50 UG/1
50 TABLET ORAL DAILY
Qty: 30 TABLET | Refills: 11 | Status: SHIPPED | OUTPATIENT
Start: 2024-08-21 | End: 2025-08-21

## 2024-08-21 ASSESSMENT — LIFESTYLE VARIABLES
HOW OFTEN DO YOU HAVE SIX OR MORE DRINKS ON ONE OCCASION: NEVER
SKIP TO QUESTIONS 9-10: 1
HOW OFTEN DO YOU HAVE A DRINK CONTAINING ALCOHOL: NEVER
HOW MANY STANDARD DRINKS CONTAINING ALCOHOL DO YOU HAVE ON A TYPICAL DAY: PATIENT DOES NOT DRINK
AUDIT-C TOTAL SCORE: 0

## 2024-08-21 ASSESSMENT — ENCOUNTER SYMPTOMS
LOSS OF SENSATION IN FEET: 0
DEPRESSION: 1
COUGH: 0
ABDOMINAL DISTENTION: 0
OCCASIONAL FEELINGS OF UNSTEADINESS: 1
PALPITATIONS: 0
SHORTNESS OF BREATH: 0

## 2024-08-21 NOTE — PROGRESS NOTES
"Subjective   Patient ID: Vidhi Medrano is a 79 y.o. female who presents for Follow-up (Med check).    HPI   COPD:          78 year old female presents with c/o COPD F/U doing well and without complaints, controlled.   Hypertension:          Patient here for F/U. stable.   Hyperlipidemia:          c/o Follow Up stable, doing well and no complaints.   Diabetes Mellitus:          Follow Up DM 2.          The patient is complaining of nothing.          Hypoglycemic episodes are none recently.          Overall blood glucose levels are well controlled, a1c 6.1         The feet are asymptomatic.          Side effects of the medications include none.     Lung cancer :  stable [facility] onc[facility]   Dementia  sees dr kerr, on namenda, stable, no issues with incontinence able to do most independent activities of daily living dress herself ,feed herself, + falls, but no injuries  Hypothyroidism:  -new onset, labs done in 5/2024 by neuro but never addressed, elevated TSH and low T4    Review of Systems   Respiratory:  Negative for cough and shortness of breath.    Cardiovascular:  Negative for chest pain and palpitations.   Gastrointestinal:  Negative for abdominal distention.       Objective   /70   Pulse 67   Temp 36.9 °C (98.5 °F)   Ht 1.575 m (5' 2\")   Wt 69 kg (152 lb 3.2 oz)   SpO2 98%   BMI 27.84 kg/m²     Physical Exam  Vitals reviewed.   Constitutional:       Appearance: Normal appearance.   Cardiovascular:      Rate and Rhythm: Normal rate and regular rhythm.      Heart sounds: Normal heart sounds. No murmur heard.  Pulmonary:      Breath sounds: Normal breath sounds.   Abdominal:      General: Abdomen is flat. Bowel sounds are normal.      Palpations: Abdomen is soft.   Musculoskeletal:         General: No swelling.   Neurological:      Mental Status: She is alert.         Assessment/Plan   Diagnoses and all orders for this visit:  Type 2 diabetes mellitus with stage 3a chronic " kidney disease, without long-term current use of insulin (Multi)  Primary hypertension  Mixed hyperlipidemia  Small cell carcinoma of lung (Multi)  Other emphysema (Multi)  Acquired hypothyroidism  -     Comprehensive Metabolic Panel; Future  -     TSH with reflex to Free T4 if abnormal; Future  -     levothyroxine (Synthroid, Levoxyl) 50 mcg tablet; Take 1 tablet (50 mcg) by mouth early in the morning.. Take on an empty stomach at the same time each day, either 30 to 60 minutes prior to breakfast  Stage 3a chronic kidney disease (Multi)  Other orders  -     Follow Up In Primary Care - Established; Future    Cont meds   Rto 3mo

## 2024-08-22 LAB — POC HEMOGLOBIN A1C: 6.1 % (ref 4.2–6.5)

## 2024-10-14 DIAGNOSIS — E03.9 ACQUIRED HYPOTHYROIDISM: ICD-10-CM

## 2024-10-14 RX ORDER — LEVOTHYROXINE SODIUM 50 UG/1
50 TABLET ORAL DAILY
Qty: 90 TABLET | Refills: 1 | Status: SHIPPED | OUTPATIENT
Start: 2024-10-14 | End: 2024-10-15 | Stop reason: SDUPTHER

## 2024-10-15 ENCOUNTER — TELEPHONE (OUTPATIENT)
Dept: PRIMARY CARE | Facility: CLINIC | Age: 79
End: 2024-10-15
Payer: MEDICARE

## 2024-10-15 DIAGNOSIS — E03.9 ACQUIRED HYPOTHYROIDISM: ICD-10-CM

## 2024-10-15 RX ORDER — LEVOTHYROXINE SODIUM 50 UG/1
50 TABLET ORAL DAILY
Qty: 90 TABLET | Refills: 1 | Status: SHIPPED | OUTPATIENT
Start: 2024-10-15 | End: 2025-10-15

## 2024-10-15 NOTE — TELEPHONE ENCOUNTER
Refill:  levothyroxine (Synthroid, Levoxyl) 50 mcg tablet Take 1 tablet (50 mcg) by mouth early in the morning.. Take on an empty stomach at the same time each day, either 30 to 60 minutes prior to breakfast   Pharm:  [facility]

## 2024-11-22 ENCOUNTER — LAB (OUTPATIENT)
Dept: LAB | Facility: LAB | Age: 79
End: 2024-11-22
Payer: MEDICARE

## 2024-11-22 DIAGNOSIS — E03.9 ACQUIRED HYPOTHYROIDISM: ICD-10-CM

## 2024-11-22 LAB
ALBUMIN SERPL BCP-MCNC: 4 G/DL (ref 3.4–5)
ALP SERPL-CCNC: 126 U/L (ref 33–136)
ALT SERPL W P-5'-P-CCNC: 19 U/L (ref 7–45)
ANION GAP SERPL CALC-SCNC: 14 MMOL/L (ref 10–20)
AST SERPL W P-5'-P-CCNC: 18 U/L (ref 9–39)
BILIRUB SERPL-MCNC: 0.7 MG/DL (ref 0–1.2)
BUN SERPL-MCNC: 19 MG/DL (ref 6–23)
CALCIUM SERPL-MCNC: 9.2 MG/DL (ref 8.6–10.3)
CHLORIDE SERPL-SCNC: 106 MMOL/L (ref 98–107)
CO2 SERPL-SCNC: 27 MMOL/L (ref 21–32)
CREAT SERPL-MCNC: 0.98 MG/DL (ref 0.5–1.05)
EGFRCR SERPLBLD CKD-EPI 2021: 58 ML/MIN/1.73M*2
GLUCOSE SERPL-MCNC: 96 MG/DL (ref 74–99)
POTASSIUM SERPL-SCNC: 4.1 MMOL/L (ref 3.5–5.3)
PROT SERPL-MCNC: 6.5 G/DL (ref 6.4–8.2)
SODIUM SERPL-SCNC: 143 MMOL/L (ref 136–145)
TSH SERPL-ACNC: 0.74 MIU/L (ref 0.44–3.98)

## 2024-11-22 PROCEDURE — 84443 ASSAY THYROID STIM HORMONE: CPT

## 2024-11-22 PROCEDURE — 80053 COMPREHEN METABOLIC PANEL: CPT

## 2024-11-22 PROCEDURE — 36415 COLL VENOUS BLD VENIPUNCTURE: CPT

## 2024-11-26 ENCOUNTER — OFFICE VISIT (OUTPATIENT)
Dept: PRIMARY CARE | Facility: CLINIC | Age: 79
End: 2024-11-26
Payer: MEDICARE

## 2024-11-26 VITALS
HEART RATE: 73 BPM | DIASTOLIC BLOOD PRESSURE: 68 MMHG | SYSTOLIC BLOOD PRESSURE: 122 MMHG | TEMPERATURE: 97.5 F | HEIGHT: 62 IN | OXYGEN SATURATION: 100 % | BODY MASS INDEX: 27.12 KG/M2 | WEIGHT: 147.4 LBS

## 2024-11-26 DIAGNOSIS — E03.9 ACQUIRED HYPOTHYROIDISM: Chronic | ICD-10-CM

## 2024-11-26 DIAGNOSIS — N18.31 TYPE 2 DIABETES MELLITUS WITH STAGE 3A CHRONIC KIDNEY DISEASE, WITHOUT LONG-TERM CURRENT USE OF INSULIN (MULTI): Primary | Chronic | ICD-10-CM

## 2024-11-26 DIAGNOSIS — F03.90 DEMENTIA WITHOUT BEHAVIORAL DISTURBANCE (MULTI): Chronic | ICD-10-CM

## 2024-11-26 DIAGNOSIS — E11.22 TYPE 2 DIABETES MELLITUS WITH STAGE 3A CHRONIC KIDNEY DISEASE, WITHOUT LONG-TERM CURRENT USE OF INSULIN (MULTI): Primary | Chronic | ICD-10-CM

## 2024-11-26 DIAGNOSIS — E78.2 MIXED HYPERLIPIDEMIA: Chronic | ICD-10-CM

## 2024-11-26 DIAGNOSIS — I10 PRIMARY HYPERTENSION: Chronic | ICD-10-CM

## 2024-11-26 LAB — POC HEMOGLOBIN A1C: 5.9 % (ref 4.2–6.5)

## 2024-11-26 PROCEDURE — 83036 HEMOGLOBIN GLYCOSYLATED A1C: CPT | Mod: MUE | Performed by: FAMILY MEDICINE

## 2024-11-26 PROCEDURE — 99214 OFFICE O/P EST MOD 30 MIN: CPT | Performed by: FAMILY MEDICINE

## 2024-11-26 PROCEDURE — 1160F RVW MEDS BY RX/DR IN RCRD: CPT | Performed by: FAMILY MEDICINE

## 2024-11-26 PROCEDURE — 3074F SYST BP LT 130 MM HG: CPT | Performed by: FAMILY MEDICINE

## 2024-11-26 PROCEDURE — 1126F AMNT PAIN NOTED NONE PRSNT: CPT | Performed by: FAMILY MEDICINE

## 2024-11-26 PROCEDURE — 3078F DIAST BP <80 MM HG: CPT | Performed by: FAMILY MEDICINE

## 2024-11-26 PROCEDURE — 1123F ACP DISCUSS/DSCN MKR DOCD: CPT | Performed by: FAMILY MEDICINE

## 2024-11-26 PROCEDURE — 1159F MED LIST DOCD IN RCRD: CPT | Performed by: FAMILY MEDICINE

## 2024-11-26 PROCEDURE — 1036F TOBACCO NON-USER: CPT | Performed by: FAMILY MEDICINE

## 2024-11-26 RX ORDER — LEVOTHYROXINE SODIUM 50 UG/1
50 TABLET ORAL DAILY
Qty: 90 TABLET | Refills: 1 | Status: SHIPPED | OUTPATIENT
Start: 2024-11-26 | End: 2025-11-26

## 2024-11-26 ASSESSMENT — LIFESTYLE VARIABLES
HOW OFTEN DO YOU HAVE SIX OR MORE DRINKS ON ONE OCCASION: LESS THAN MONTHLY
HOW OFTEN DO YOU HAVE A DRINK CONTAINING ALCOHOL: MONTHLY OR LESS
AUDIT-C TOTAL SCORE: 2
SKIP TO QUESTIONS 9-10: 0
HOW MANY STANDARD DRINKS CONTAINING ALCOHOL DO YOU HAVE ON A TYPICAL DAY: 1 OR 2

## 2024-11-26 ASSESSMENT — PAIN SCALES - GENERAL: PAINLEVEL_OUTOF10: 0-NO PAIN

## 2024-11-26 ASSESSMENT — ENCOUNTER SYMPTOMS
COUGH: 0
SHORTNESS OF BREATH: 0
OCCASIONAL FEELINGS OF UNSTEADINESS: 1
LOSS OF SENSATION IN FEET: 0
DEPRESSION: 0
PALPITATIONS: 0
ABDOMINAL DISTENTION: 0

## 2024-11-26 ASSESSMENT — PATIENT HEALTH QUESTIONNAIRE - PHQ9
1. LITTLE INTEREST OR PLEASURE IN DOING THINGS: NOT AT ALL
SUM OF ALL RESPONSES TO PHQ9 QUESTIONS 1 AND 2: 0
2. FEELING DOWN, DEPRESSED OR HOPELESS: NOT AT ALL

## 2024-11-26 NOTE — PROGRESS NOTES
"Subjective   Patient ID: Vidhi Medrano is a 79 y.o. female who presents for Diabetes (Follow up for thyroid as well per daughter).    HPI   COPD:          c/o COPD F/U doing well and without complaints, controlled.   Hypertension:          Patient here for F/U. stable.   Hyperlipidemia:          c/o Follow Up stable, doing well and no complaints.   Diabetes Mellitus:          Follow Up DM 2.          The patient is complaining of nothing.          Hypoglycemic episodes are none recently.          Overall blood glucose levels are well controlled, a1c 5.9         The feet are asymptomatic.          Side effects of the medications include none.     Lung cancer :  stable [facility] onc[facility]   Dementia  sees dr kerr, on namenda, stable, no issues with incontinence able to do most independent activities of daily living dress herself ,feed herself,  sleeps good  Hypothyroidism:  -Chronic stable problem  -Tolerating medication  -Concerns: none    Review of Systems   Respiratory:  Negative for cough and shortness of breath.    Cardiovascular:  Negative for chest pain and palpitations.   Gastrointestinal:  Negative for abdominal distention.       Objective   /68   Pulse 73   Temp 36.4 °C (97.5 °F)   Ht 1.575 m (5' 2\")   Wt 66.9 kg (147 lb 6.4 oz)   SpO2 100%   BMI 26.96 kg/m²     Physical Exam  Vitals reviewed.   Constitutional:       Appearance: Normal appearance.   Cardiovascular:      Rate and Rhythm: Normal rate and regular rhythm.      Heart sounds: Normal heart sounds. No murmur heard.  Pulmonary:      Breath sounds: Normal breath sounds.   Abdominal:      General: Abdomen is flat. Bowel sounds are normal.      Palpations: Abdomen is soft.   Musculoskeletal:         General: No swelling.   Neurological:      Mental Status: She is alert.         Assessment/Plan   Diagnoses and all orders for this visit:  Type 2 diabetes mellitus with stage 3a chronic kidney disease, without " long-term current use of insulin (Multi)  -     POCT glycosylated hemoglobin (Hb A1C) manually resulted  Primary hypertension  Mixed hyperlipidemia  Dementia without behavioral disturbance (Multi)  Acquired hypothyroidism  -     levothyroxine (Synthroid, Levoxyl) 50 mcg tablet; Take 1 tablet (50 mcg) by mouth early in the morning.. Take on an empty stomach at the same time each day, either 30 to 60 minutes prior to breakfast  Other orders  -     Follow Up In Primary Care - Established  -     Follow Up In Primary Care - Medicare Annual; Future    Rto 3 mo pe

## 2024-12-16 ENCOUNTER — APPOINTMENT (OUTPATIENT)
Dept: NEUROLOGY | Facility: CLINIC | Age: 79
End: 2024-12-16
Payer: MEDICARE

## 2024-12-16 DIAGNOSIS — F03.90 DEMENTIA WITHOUT BEHAVIORAL DISTURBANCE (MULTI): ICD-10-CM

## 2024-12-16 PROCEDURE — 1123F ACP DISCUSS/DSCN MKR DOCD: CPT | Performed by: PSYCHIATRY & NEUROLOGY

## 2024-12-16 PROCEDURE — 99212 OFFICE O/P EST SF 10 MIN: CPT | Performed by: PSYCHIATRY & NEUROLOGY

## 2024-12-16 RX ORDER — MEMANTINE HYDROCHLORIDE 10 MG/1
10 TABLET ORAL 2 TIMES DAILY
Qty: 180 TABLET | Refills: 3 | Status: SHIPPED | OUTPATIENT
Start: 2024-12-16 | End: 2025-12-16

## 2024-12-16 ASSESSMENT — MINI MENTAL STATE EXAM
WHAT IS THE YEAR, SEASON, DATE, DAY, AND MONTH: 4 CORRECT
NAME OR REPEAT 3 OBJECTS - (APPLE, TABLE, PENNY) OR (BALL, TREE, FLAG): 2 CORRECT
RECALL THE 3 OBJECTS FROM ABOVE (APPLE, TABLE, PENNY) OR (BALL, TREE, FLAG): 2 CORRECT
PLEASE COPY THIS PICTURE (NOTE ALL 10 ANGLES MUST BE PRESENT AND TWO MUST INTERSECT): 1 CORRECT
HAND THE PERSON A PENCIL AND PAPER. SAY:  WRITE ANY COMPLETE SENTENCE ON THAT PIECE OF PAPER. (NOTE: THE SENTENCE MUST MAKE SENSE.  IGNORE SPELLING ERRORS): 1 CORRECT
PLACE DESIGN, ERASER AND PENCIL IN FRONT OF THE PERSON.  SAY:  COPY THIS DESIGN PLEASE.  SHOW: DESIGN. ALLOW: MULTIPLE TRIES. WAIT UNTIL PERSON IS FINISHED AND HANDS IT BACK. SCORE: ONLY FOR DIAGRAM WITH 4-SIDED FIGURE BETWEEN TWO 5-SIDED FIGURES: 1 CORRECT
SHOW: PENCIL [OBJECT] ASK: WHAT IS THIS CALLED?: 2 CORRECT
SPELL THE WORD WORLD FORWARD AND BACKWARDS OR SERIAL 7S: 4 CORRECT
SAY: I WOULD LIKE YOU TO REPEAT THIS PHRASE AFTER ME: NO IFS, ANDS, OR BUTS.: 1 CORRECT
SAY:  READ THE WORDS ON THE PAGE AND THEN DO WHAT IT SAYS.  THEN HAND THE PERSON THE SHEET WITH CLOSE YOUR EYES ON IT.  IF THE SUBJECT READS AND DOES NOT CLOSE THEIR EYES, REPEAT UP TO THREE TIMES.  SCORE ONLY IF SUBJECT CLOSES EYES.: 3 CORRECT
SUM ALL MMSE QUESTIONS FOR TOTAL SCORE [OUT OF 30].: 25
WHAT STATE, COUNTRY, CITY, HOSPITAL, FLOOR: 4 CORRECT

## 2024-12-16 NOTE — PATIENT INSTRUCTIONS
Pat, you are doing well, try the higher dosing of memantine, and continue your present activity level.  Follow up in 9 months- September, 2025.

## 2024-12-16 NOTE — PROGRESS NOTES
Subjective   Vidhi Medrano is a 79 y.o.   female.  HPI  This is a 79 year old woman with presumed mild dementia, likely related to chemo and radiation.  She still needs help in performing ADLs.  She is on memantine 5 mg twice a day, no adverse effects.  She is in a wheelchair, and she is with her daughter.  Objective   Neurological Exam  Alert and cooperative.  MMSE= 25/30.  Physical Exam  I personally reviewed laboratory, radiographic, and medical studies which were pertinent for nothing.    Assessment/Plan

## 2025-02-14 DIAGNOSIS — E78.2 MIXED HYPERLIPIDEMIA: ICD-10-CM

## 2025-02-14 DIAGNOSIS — I10 PRIMARY HYPERTENSION: ICD-10-CM

## 2025-02-14 RX ORDER — AMLODIPINE BESYLATE 5 MG/1
5 TABLET ORAL DAILY
Qty: 90 TABLET | Refills: 1 | Status: SHIPPED | OUTPATIENT
Start: 2025-02-14

## 2025-02-14 RX ORDER — ATORVASTATIN CALCIUM 40 MG/1
40 TABLET, FILM COATED ORAL DAILY
Qty: 90 TABLET | Refills: 1 | Status: SHIPPED | OUTPATIENT
Start: 2025-02-14

## 2025-03-10 DIAGNOSIS — F03.90 DEMENTIA WITHOUT BEHAVIORAL DISTURBANCE (MULTI): ICD-10-CM

## 2025-03-10 RX ORDER — MEMANTINE HYDROCHLORIDE 10 MG/1
10 TABLET ORAL 2 TIMES DAILY
Qty: 180 TABLET | Refills: 1 | Status: SHIPPED | OUTPATIENT
Start: 2025-03-10

## 2025-03-17 DIAGNOSIS — E11.9 TYPE 2 DIABETES MELLITUS WITHOUT COMPLICATION, WITHOUT LONG-TERM CURRENT USE OF INSULIN (MULTI): Chronic | ICD-10-CM

## 2025-03-17 RX ORDER — METFORMIN HYDROCHLORIDE 500 MG/1
TABLET, EXTENDED RELEASE ORAL
Qty: 90 TABLET | Refills: 3 | Status: SHIPPED | OUTPATIENT
Start: 2025-03-17

## 2025-03-18 ENCOUNTER — OFFICE VISIT (OUTPATIENT)
Dept: PRIMARY CARE | Facility: CLINIC | Age: 80
End: 2025-03-18
Payer: MEDICARE

## 2025-03-18 VITALS
HEART RATE: 67 BPM | SYSTOLIC BLOOD PRESSURE: 118 MMHG | DIASTOLIC BLOOD PRESSURE: 70 MMHG | BODY MASS INDEX: 26.65 KG/M2 | OXYGEN SATURATION: 96 % | WEIGHT: 144.8 LBS | TEMPERATURE: 98.1 F | HEIGHT: 62 IN

## 2025-03-18 DIAGNOSIS — N18.31 STAGE 3A CHRONIC KIDNEY DISEASE (MULTI): ICD-10-CM

## 2025-03-18 DIAGNOSIS — E03.9 ACQUIRED HYPOTHYROIDISM: ICD-10-CM

## 2025-03-18 DIAGNOSIS — H61.23 BILATERAL IMPACTED CERUMEN: ICD-10-CM

## 2025-03-18 DIAGNOSIS — E11.42 POLYNEUROPATHY DUE TO TYPE 2 DIABETES MELLITUS (MULTI): Chronic | ICD-10-CM

## 2025-03-18 DIAGNOSIS — I10 PRIMARY HYPERTENSION: Chronic | ICD-10-CM

## 2025-03-18 DIAGNOSIS — E78.2 MIXED HYPERLIPIDEMIA: Chronic | ICD-10-CM

## 2025-03-18 DIAGNOSIS — C34.90 SMALL CELL CARCINOMA OF LUNG, UNSPECIFIED LATERALITY, UNSPECIFIED PART OF LUNG: Chronic | ICD-10-CM

## 2025-03-18 DIAGNOSIS — Z00.00 ROUTINE GENERAL MEDICAL EXAMINATION AT HEALTH CARE FACILITY: ICD-10-CM

## 2025-03-18 DIAGNOSIS — J43.8 OTHER EMPHYSEMA (MULTI): Chronic | ICD-10-CM

## 2025-03-18 DIAGNOSIS — E11.9 TYPE 2 DIABETES MELLITUS WITHOUT COMPLICATION, WITHOUT LONG-TERM CURRENT USE OF INSULIN (MULTI): Primary | Chronic | ICD-10-CM

## 2025-03-18 DIAGNOSIS — F03.90 DEMENTIA WITHOUT BEHAVIORAL DISTURBANCE (MULTI): Chronic | ICD-10-CM

## 2025-03-18 LAB — POC HEMOGLOBIN A1C: 5.7 % (ref 4.2–6.5)

## 2025-03-18 PROCEDURE — 3074F SYST BP LT 130 MM HG: CPT | Performed by: FAMILY MEDICINE

## 2025-03-18 PROCEDURE — 99214 OFFICE O/P EST MOD 30 MIN: CPT | Performed by: FAMILY MEDICINE

## 2025-03-18 PROCEDURE — 1036F TOBACCO NON-USER: CPT | Performed by: FAMILY MEDICINE

## 2025-03-18 PROCEDURE — 99215 OFFICE O/P EST HI 40 MIN: CPT | Mod: 25 | Performed by: FAMILY MEDICINE

## 2025-03-18 PROCEDURE — 69209 REMOVE IMPACTED EAR WAX UNI: CPT | Performed by: FAMILY MEDICINE

## 2025-03-18 PROCEDURE — 1126F AMNT PAIN NOTED NONE PRSNT: CPT | Performed by: FAMILY MEDICINE

## 2025-03-18 PROCEDURE — 1158F ADVNC CARE PLAN TLK DOCD: CPT | Performed by: FAMILY MEDICINE

## 2025-03-18 PROCEDURE — 1170F FXNL STATUS ASSESSED: CPT | Performed by: FAMILY MEDICINE

## 2025-03-18 PROCEDURE — 1160F RVW MEDS BY RX/DR IN RCRD: CPT | Performed by: FAMILY MEDICINE

## 2025-03-18 PROCEDURE — G0439 PPPS, SUBSEQ VISIT: HCPCS | Performed by: FAMILY MEDICINE

## 2025-03-18 PROCEDURE — 3078F DIAST BP <80 MM HG: CPT | Performed by: FAMILY MEDICINE

## 2025-03-18 PROCEDURE — 1159F MED LIST DOCD IN RCRD: CPT | Performed by: FAMILY MEDICINE

## 2025-03-18 PROCEDURE — 83036 HEMOGLOBIN GLYCOSYLATED A1C: CPT | Mod: MUE | Performed by: FAMILY MEDICINE

## 2025-03-18 PROCEDURE — 1123F ACP DISCUSS/DSCN MKR DOCD: CPT | Performed by: FAMILY MEDICINE

## 2025-03-18 RX ORDER — LEVOTHYROXINE SODIUM 50 UG/1
50 TABLET ORAL DAILY
Qty: 90 TABLET | Refills: 3 | Status: SHIPPED | OUTPATIENT
Start: 2025-03-18 | End: 2026-03-18

## 2025-03-18 ASSESSMENT — ENCOUNTER SYMPTOMS
PALPITATIONS: 0
ABDOMINAL DISTENTION: 0
DEPRESSION: 0
LOSS OF SENSATION IN FEET: 0
COUGH: 0
SHORTNESS OF BREATH: 0
OCCASIONAL FEELINGS OF UNSTEADINESS: 1

## 2025-03-18 ASSESSMENT — ACTIVITIES OF DAILY LIVING (ADL)
DOING_HOUSEWORK: TOTAL CARE
BATHING: INDEPENDENT
DRESSING: INDEPENDENT
GROCERY_SHOPPING: TOTAL CARE
TAKING_MEDICATION: TOTAL CARE
MANAGING_FINANCES: TOTAL CARE

## 2025-03-18 ASSESSMENT — LIFESTYLE VARIABLES
SKIP TO QUESTIONS 9-10: 1
AUDIT-C TOTAL SCORE: 0
HOW OFTEN DO YOU HAVE A DRINK CONTAINING ALCOHOL: NEVER
HOW MANY STANDARD DRINKS CONTAINING ALCOHOL DO YOU HAVE ON A TYPICAL DAY: PATIENT DOES NOT DRINK
HOW OFTEN DO YOU HAVE SIX OR MORE DRINKS ON ONE OCCASION: NEVER

## 2025-03-18 ASSESSMENT — PATIENT HEALTH QUESTIONNAIRE - PHQ9
1. LITTLE INTEREST OR PLEASURE IN DOING THINGS: NOT AT ALL
2. FEELING DOWN, DEPRESSED OR HOPELESS: NOT AT ALL
SUM OF ALL RESPONSES TO PHQ9 QUESTIONS 1 AND 2: 0

## 2025-03-18 ASSESSMENT — PAIN SCALES - GENERAL: PAINLEVEL_OUTOF10: 0-NO PAIN

## 2025-03-18 NOTE — ASSESSMENT & PLAN NOTE
Orders:    Lipid Panel; Future    levothyroxine (Synthroid, Levoxyl) 50 mcg tablet; Take 1 tablet (50 mcg) by mouth early in the morning.. Take on an empty stomach at the same time each day, either 30 to 60 minutes prior to breakfast

## 2025-03-18 NOTE — ASSESSMENT & PLAN NOTE
Orders:    Urinalysis with Reflex Microscopic; Future    Albumin-Creatinine Ratio, Urine Random; Future    Comprehensive Metabolic Panel; Future    CBC and Auto Differential; Future    TSH with reflex to Free T4 if abnormal; Future    Lipid Panel; Future    POCT glycosylated hemoglobin (Hb A1C) manually resulted

## 2025-03-18 NOTE — PROGRESS NOTES
"Subjective   Reason for Visit: Vidhi Medrano is an 80 y.o. female here for a Medicare Wellness visit.     Past Medical, Surgical, and Family History reviewed and updated in chart.    Reviewed all medications by prescribing practitioner or clinical pharmacist (such as prescriptions, OTCs, herbal therapies and supplements) and documented in the medical record.    HPI  COPD:          c/o COPD F/U doing well and without complaints, controlled.   Hypertension :   -Patient is here for follow-up hypertension: chronic , stable  -Tolerating medications without side effects    Hyperlipidemia:          c/o Follow Up stable, doing well and no complaints. On atorvastatin  Diabetes Mellitus:          Follow Up DM 2.          The patient is complaining of nothing.          Hypoglycemic episodes are none recently.          Overall blood glucose levels are well controlled,          A1c 5.7          Side effects of the medications include none.     Lung cancer :  stable sees onc, small cell, [facility] oncology   Dementia  sees dr bonilla, on namenda, stable, no issues with incontinence able to do most independent activities of daily living dress herself ,feed herself, + falls, but no injuries  Hypothyroidism:  -Chronic stable problem  -Tolerating medication, due for tsh  -Concerns: none    Patient Care Team:  Vineet Craig MD as PCP - General (Family Medicine)  Vineet Craig MD as PCP - [employer] PCP  Stanford Pizarro MD as Medical Oncologist (Hematology and Oncology)  Maico Bonilla MD as On Call Attending Physician (Neurology)     Review of Systems   Respiratory:  Negative for cough and shortness of breath.    Cardiovascular:  Negative for chest pain and palpitations.   Gastrointestinal:  Negative for abdominal distention.       Objective   Vitals:  /70   Pulse 67   Temp 36.7 °C (98.1 °F) (Temporal)   Ht 1.575 m (5' 2\")   Wt 65.7 kg (144 lb 12.8 oz)   SpO2 96%   BMI 26.48 kg/m²   "     Physical Exam  Vitals reviewed.   Constitutional:       Appearance: Normal appearance.   HENT:      Right Ear: Tympanic membrane, ear canal and external ear normal. There is impacted cerumen.      Left Ear: Tympanic membrane, ear canal and external ear normal. There is impacted cerumen.      Nose: Nose normal.      Mouth/Throat:      Mouth: Mucous membranes are moist.   Eyes:      Extraocular Movements: Extraocular movements intact.      Conjunctiva/sclera: Conjunctivae normal.      Pupils: Pupils are equal, round, and reactive to light.   Cardiovascular:      Rate and Rhythm: Normal rate and regular rhythm.      Pulses: Normal pulses.      Heart sounds: Normal heart sounds.   Pulmonary:      Effort: Pulmonary effort is normal.      Breath sounds: Normal breath sounds.   Abdominal:      General: Abdomen is flat. Bowel sounds are normal.      Palpations: Abdomen is soft.   Genitourinary:     Comments: Exam deferred to gyn  Musculoskeletal:         General: Normal range of motion.      Cervical back: Neck supple.   Skin:     General: Skin is warm.   Neurological:      General: No focal deficit present.      Mental Status: She is alert and oriented to person, place, and time. Mental status is at baseline.   Psychiatric:         Mood and Affect: Mood normal.         Assessment & Plan  Dementia without behavioral disturbance (Multi)         Mixed hyperlipidemia         Other emphysema (Multi)         Type 2 diabetes mellitus without complication, without long-term current use of insulin (Multi)    Orders:    Urinalysis with Reflex Microscopic; Future    Albumin-Creatinine Ratio, Urine Random; Future    Comprehensive Metabolic Panel; Future    CBC and Auto Differential; Future    TSH with reflex to Free T4 if abnormal; Future    Lipid Panel; Future    POCT glycosylated hemoglobin (Hb A1C) manually resulted    Polyneuropathy due to type 2 diabetes mellitus (Multi)         Primary hypertension         Small cell  carcinoma of lung, unspecified laterality, unspecified part of lung         Acquired hypothyroidism    Orders:    Lipid Panel; Future    levothyroxine (Synthroid, Levoxyl) 50 mcg tablet; Take 1 tablet (50 mcg) by mouth early in the morning.. Take on an empty stomach at the same time each day, either 30 to 60 minutes prior to breakfast    Stage 3a chronic kidney disease (Multi)         Routine general medical examination at health care facility    Orders:    1 Year Follow Up In Primary Care - Wellness Exam; Future     Patient ID: Vidhi Medrano is a 80 y.o. female.    Ear Cerumen Removal    Date/Time: 3/18/2025 2:41 PM    Performed by: Vineet Craig MD  Authorized by: Vineet Craig MD    Consent:     Consent obtained:  Verbal    Consent given by:  Patient    Risks, benefits, and alternatives were discussed: yes      Risks discussed:  Dizziness, bleeding, infection and pain    Alternatives discussed:  No treatment  Universal protocol:     Procedure explained and questions answered to patient or proxy's satisfaction: yes      Relevant documents present and verified: yes      Immediately prior to procedure, a time out was called: yes      Patient identity confirmed:  Verbally with patient  Procedure details:     Location:  L ear    Procedure type: irrigation    Post-procedure details:     Inspection:  No bleeding    Procedure completion:  Tolerated well, no immediate complications    Left ear canal and cerumen were removed without difficulty, the right ear was unable to be completely cleared 100%, recommended use over-the-counter Debrox to help loosen up the wax and come back and if he needs to be clean       Patient was identified as a fall risk. Risk prevention instructions provided.

## 2025-04-29 DIAGNOSIS — C34.90 SMALL CELL CARCINOMA OF LUNG, UNSPECIFIED LATERALITY, UNSPECIFIED PART OF LUNG: Primary | Chronic | ICD-10-CM

## 2025-05-01 ENCOUNTER — LAB (OUTPATIENT)
Dept: LAB | Facility: HOSPITAL | Age: 80
End: 2025-05-01
Payer: MEDICARE

## 2025-05-01 DIAGNOSIS — C34.90 MALIGNANT NEOPLASM OF UNSPECIFIED PART OF UNSPECIFIED BRONCHUS OR LUNG (MULTI): Primary | ICD-10-CM

## 2025-05-01 LAB
ALBUMIN SERPL BCP-MCNC: 4 G/DL (ref 3.4–5)
ALP SERPL-CCNC: 138 U/L (ref 33–136)
ALT SERPL W P-5'-P-CCNC: 14 U/L (ref 7–45)
ANION GAP SERPL CALC-SCNC: 13 MMOL/L (ref 10–20)
AST SERPL W P-5'-P-CCNC: 14 U/L (ref 9–39)
BASOPHILS # BLD AUTO: 0.04 X10*3/UL (ref 0–0.1)
BASOPHILS NFR BLD AUTO: 0.6 %
BILIRUB SERPL-MCNC: 0.7 MG/DL (ref 0–1.2)
BUN SERPL-MCNC: 20 MG/DL (ref 6–23)
CALCIUM SERPL-MCNC: 9.4 MG/DL (ref 8.6–10.3)
CHLORIDE SERPL-SCNC: 104 MMOL/L (ref 98–107)
CO2 SERPL-SCNC: 27 MMOL/L (ref 21–32)
CREAT SERPL-MCNC: 0.92 MG/DL (ref 0.5–1.05)
EGFRCR SERPLBLD CKD-EPI 2021: 62 ML/MIN/1.73M*2
EOSINOPHIL # BLD AUTO: 0.14 X10*3/UL (ref 0–0.4)
EOSINOPHIL NFR BLD AUTO: 1.9 %
ERYTHROCYTE [DISTWIDTH] IN BLOOD BY AUTOMATED COUNT: 13.9 % (ref 11.5–14.5)
GLUCOSE SERPL-MCNC: 143 MG/DL (ref 74–99)
HCT VFR BLD AUTO: 36.2 % (ref 36–46)
HGB BLD-MCNC: 12 G/DL (ref 12–16)
IMM GRANULOCYTES # BLD AUTO: 0.04 X10*3/UL (ref 0–0.5)
IMM GRANULOCYTES NFR BLD AUTO: 0.6 % (ref 0–0.9)
LYMPHOCYTES # BLD AUTO: 1.52 X10*3/UL (ref 0.8–3)
LYMPHOCYTES NFR BLD AUTO: 21 %
MCH RBC QN AUTO: 30 PG (ref 26–34)
MCHC RBC AUTO-ENTMCNC: 33.1 G/DL (ref 32–36)
MCV RBC AUTO: 91 FL (ref 80–100)
MONOCYTES # BLD AUTO: 0.57 X10*3/UL (ref 0.05–0.8)
MONOCYTES NFR BLD AUTO: 7.9 %
NEUTROPHILS # BLD AUTO: 4.93 X10*3/UL (ref 1.6–5.5)
NEUTROPHILS NFR BLD AUTO: 68 %
NRBC BLD-RTO: 0 /100 WBCS (ref 0–0)
PLATELET # BLD AUTO: 180 X10*3/UL (ref 150–450)
POTASSIUM SERPL-SCNC: 3.9 MMOL/L (ref 3.5–5.3)
PROT SERPL-MCNC: 6.5 G/DL (ref 6.4–8.2)
RBC # BLD AUTO: 4 X10*6/UL (ref 4–5.2)
SODIUM SERPL-SCNC: 140 MMOL/L (ref 136–145)
WBC # BLD AUTO: 7.2 X10*3/UL (ref 4.4–11.3)

## 2025-05-01 PROCEDURE — 80053 COMPREHEN METABOLIC PANEL: CPT

## 2025-05-01 PROCEDURE — 85025 COMPLETE CBC W/AUTO DIFF WBC: CPT

## 2025-05-05 ENCOUNTER — APPOINTMENT (OUTPATIENT)
Dept: RADIOLOGY | Facility: HOSPITAL | Age: 80
End: 2025-05-05
Payer: MEDICARE

## 2025-05-06 ENCOUNTER — HOSPITAL ENCOUNTER (OUTPATIENT)
Dept: RADIOLOGY | Facility: HOSPITAL | Age: 80
Discharge: HOME | End: 2025-05-06
Payer: MEDICARE

## 2025-05-06 DIAGNOSIS — C34.90 SMALL CELL CARCINOMA OF LUNG: ICD-10-CM

## 2025-05-06 PROCEDURE — 71260 CT THORAX DX C+: CPT

## 2025-05-06 PROCEDURE — 71260 CT THORAX DX C+: CPT | Performed by: RADIOLOGY

## 2025-05-06 PROCEDURE — 2550000001 HC RX 255 CONTRASTS

## 2025-05-06 RX ADMIN — IOHEXOL 75 ML: 350 INJECTION, SOLUTION INTRAVENOUS at 16:08

## 2025-05-08 ENCOUNTER — TELEMEDICINE (OUTPATIENT)
Dept: HEMATOLOGY/ONCOLOGY | Facility: CLINIC | Age: 80
End: 2025-05-08
Payer: MEDICARE

## 2025-05-08 DIAGNOSIS — C34.90 SMALL CELL CARCINOMA OF LUNG: ICD-10-CM

## 2025-05-08 PROCEDURE — 99214 OFFICE O/P EST MOD 30 MIN: CPT | Performed by: PHYSICIAN ASSISTANT

## 2025-05-08 NOTE — PROGRESS NOTES
Visit Type: Benign Heme Follow-up, Virtual Visit:  A Virtual visit (telephone only) between the patient (at the originating site) and the provider (at the distant site) was utilized to provide this telehealth service.   Verbal Consent for Encounter: Verbal consent was requested and obtained from patient, or from parent/guardian if minor, on this date for a telehealth visit.     Patient ID: Vidhi Medrano is a 80 y.o. female.  Referring Physician: Reina Zaman PA-C  [id number] [place] [address]  Primary Care Provider: Vineet Craig MD      Subjective    HPI    Chief Complaint: follow up lung cancer   Interval History:    Patient of Dr. White and Reina Zaman, DORIS      Reason for visit: Right Lung nodule , hx of small cell lung cancer      HPI      78 year old woman with  hx of limited stage small cell lung cancer diagnosed June 2018 s/p definitive chemoxrt with carboplatin/Etoposide followed by PCI, cTxN2  presented with only hyper metabolic active mediastinal nodes and no lung mass , HTN, HLD, DM, CAD s/p MI, TIA, peripheral neuropathy, smoking, COPD, OA s/p left TKA, R breast cancer originally diagnosed  in 2011 s/p R mastectomy ER/AL positive, pT1cN0, 1.1 cm tumor, grade 1, s/p adjuvant Arimidex until her lung cancer diagnosis,  who was following for surveillance of her cancer      she had a CT screening in 2018 that showed an enlarged prevascular LN   PET scan showed uptake in the LN and findings suggestive of adrenal adenoma  brain MRI showed single 6.5 contrast enhancement area overlying the left frontal lobe non specific   treated with concurrent chemoradiation followed by PCI   I also see a report of a mediastinal lymph node biopsy in Nov 2018 that showed findings suggestive of CD10+ lymphoproliferative disease, not clear the circumstances of biopsy from available records     She was on surveillance with CT scans   CT chest April 2021: RLL calcified granuloma , no other susupicious nodules,  mildly prominent subcarinal LN   CT scan of chest Oct 2021: small nodule AZUCENA measuring 4 mm, stable   Ct scan Nov 2022: stable tiny b/l nodules, new AZUCENA 3 mm nodule looks suspicious, 8 x 6 mm infiltrative nodule in AZUCENA could be slowly growing tumor   CT scan March 2023: increased nodule in AZUCENA 9 x 8 mm previously 5 x 4 mm (?comparison CT) , new 4 mm RUL nodule  PET scan 5/9/23: intense metabolic activity in AZUCENA nodule SUV 6.1, non specific activity in area of femoral head   Bone scan 5/26: no abnormal radiotracer uptake   6/6/23 bx of AZUCENA nodule: Alveloar lung parenchyma, non diagnostic, limited tissue      she denies major change in breathing   no increased SOB or COLINDRES   she can walk a bit but not too long   she feels weakness in her legs   this has worsened over the last year or so   now only can do some walking   hx of TIA in Feb 2023  had some falls   no headaches or back pain   gets lightheaded   has memory changes   no increased cough   no oxygen requirements   eating and drinking well, it is stable now     PAST MEDICAL HISTORY:   hx of limited stage small cell lung cancer diagnosed June 2018 s/p definitive chemoxrt with carboplatin/Etoposide followed by PCI , cTxN2 presented with only hyper metabolic active mediastinal nodes and no lung mass , HTN, HLD, DM, CAD s/p MI, TIA, peripheral neuropathy, smoking, COPD, OA s/p left TKA, R breast cancer originally diagnosed in 2011 s/p R mastectomy     SOCIAL HISTORY:   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     Interval history:  Patient presents virtually due to difficulty ambulating. She is doing well in general   Breathing at baseline. Continues to have intermittent falls, she usually uses a walker  She has  "seen neurology for her cognitive decline, and was started on memantine and is stable.   No respiratory symptoms at this time, no increased cough or SOB. Eating and drinking well, her weight is maintained. no nausea, vomiting. Denies any fever, infections.      REVIEW OF SYSTEMS:  All of the systems have been reviewed and are negative for complaints except what is stated in the HPI and/or past medical history.    Allergies:  RX Allergies[1]    Outpatient medications:  Medications Ordered Prior to Encounter[2]    Vitals:      2/1/2024     4:20 PM 3/20/2024     3:22 PM 3/25/2024     3:56 PM 5/9/2024     4:01 PM 8/21/2024     5:08 PM 11/26/2024     3:35 PM 3/18/2025     2:14 PM   Vitals   Systolic  118 120 133 130 122 118   Diastolic  70 78 71 70 68 70   BP Location  Right arm Left arm Right arm      Heart Rate  75 76 60 67 73 67   Temp 36.2 °C (97.1 °F)  36.7 °C (98 °F) 36.8 °C (98.2 °F) 36.9 °C (98.5 °F) 36.4 °C (97.5 °F) 36.7 °C (98.1 °F)   Resp    17      Height 1.575 m (5' 2\") 1.575 m (5' 2\") 1.575 m (5' 2\")  1.575 m (5' 2\") 1.575 m (5' 2\") 1.575 m (5' 2\")   Weight (lb) -- -- 152 154.65 152.2 147.4 144.8   BMI 28.35 kg/m2 28.35 kg/m2 27.8 kg/m2 28.29 kg/m2 27.84 kg/m2 26.96 kg/m2 26.48 kg/m2   BSA (m2) 1.75 m2 1.75 m2 1.74 m2 1.75 m2 1.74 m2 1.71 m2 1.7 m2   Visit Report Report Report Report Report Report Report Report     Physical exam:  Deferred due to telehealth    Labs:  Labs:  Lab Results   Component Value Date    WBC 7.2 05/01/2025    NEUTROABS 4.93 05/01/2025    IGABSOL 0.04 05/01/2025    LYMPHSABS 1.52 05/01/2025    MONOSABS 0.57 05/01/2025    EOSABS 0.14 05/01/2025    BASOSABS 0.04 05/01/2025    RBC 4.00 05/01/2025    MCV 91 05/01/2025    MCHC 33.1 05/01/2025    HGB 12.0 05/01/2025    HCT 36.2 05/01/2025     05/01/2025     No results found for: \"RETICCTPCT\"   Lab Results   Component Value Date    CREATININE 0.92 05/01/2025    BUN 20 05/01/2025    EGFR 62 05/01/2025     05/01/2025    K 3.9 " 05/01/2025     05/01/2025    CO2 27 05/01/2025      Lab Results   Component Value Date    ALT 14 05/01/2025    AST 14 05/01/2025    ALKPHOS 138 (H) 05/01/2025    BILITOT 0.7 05/01/2025      Imaging results  nterpreted By:  Miesha Cary,   STUDY:  CT CHEST W IV CONTRAST;  5/6/2025 4:16 pm      INDICATION:  Signs/Symptoms:surveillance lung cancer.      ,C34.90 Malignant neoplasm of unspecified part of unspecified  bronchus or lung (Multi)      COMPARISON:  05/03/2024      ACCESSION NUMBER(S):  [id number]      ORDERING CLINICIAN:  CARLOS GROSS      TECHNIQUE:  Helical data acquisition of the chest was obtained  with IV contrast  material. 75 mL intravenous contrast material, Omnipaque 350 was  given for this exam. Images were reformatted in axial, coronal, and  sagittal planes.      FINDINGS:      LUNGS AND AIRWAYS:  The trachea and central airways are patent. No endobronchial lesion.      Pulmonary hyperinflation suggestive of COPD similar to prior.  Bibasilar scarring or atelectasis. No suspicious lung nodules or  masses are seen.      MEDIASTINUM AND ABHIJEET, LOWER NECK AND AXILLA:  The visualized thyroid gland is within normal limits.      No evidence of thoracic lymphadenopathy by CT criteria.      Esophagus appears within normal limits as seen.      HEART AND VESSELS:  Moderate diffuse wall calcification of the aorta particularly the  aortic arch and arch vessels.      Main pulmonary artery and its branches are normal in caliber.      Mild coronary wall calcifications. The study is not optimized for  evaluation of coronary arteries.      The cardiac chambers are not enlarged.      No evidence of pericardial effusion.      UPPER ABDOMEN:  Limited evaluation of visualized upper abdominal structures  demonstrate low-attenuation lesions within bilateral kidneys  suggestive of cysts but too small to fully characterize.  Nonobstructing left renal calculus measuring 5 mm. No hydronephrosis  or hydroureter within  visualized kidneys. Low-attenuation lesion  involving left adrenal gland similar to prior which may represent  adrenal adenoma but incompletely characterized in this study.      CHEST WALL AND OSSEOUS STRUCTURES:  Postoperative changes related to right mastectomy similar to prior.  No destructive bone lesions. Mild multilevel discogenic degenerative  changes.      IMPRESSION:  1. No significant interval changes since previous exam. No evidence  of acute pathology.  2. Additional detailed findings as above.       Assessment/Plan:  1. History of limited stage small cell lung cancer      hx if limited stage SCLC s/p definitive chemoxrt with carbo/etoposide/ xrt in 2018   followed by PCI   now with a new AZUCENA lung nodule with FDG avidity   non diagnostic biopsy      we discussed again to refer for another bx transcutaneous or via navigational bronchoscopy   she wants to think about it   alternative include evaluation for treatment due to high suspicion and referral to radiation oncology to check eligibility for sbrt given prior radiation, also wants to wait and think about it      will check a brain MRI in meantime given neurologic symptoms      8/1- MRI brain was negative   referred for EBUS and bx, performed with Dr. Fitch, lung nodule was small and less solid on navigational bronc scan, path showed no malignancy   will plan to repeat a scan in 3 months, but will review imaging on TB if there is any suggestion to treat regardless of biopsy vs surveillance imaging     11/3/2023-  Case was reviewed on the thoracic tumor board and recommended for a repeat CT scan of the chest  The repeat CT scan was done on 10/30/2023 and is reassuring, lung nodule of concern has decreased in the size and there are other stable lung nodules  At this time there is no evidence of malignancy, we will plan to repeat another CT scan of the chest in 6 months and perhaps afterwards she can go to yearly low-dose CT screening if there are no  concerning findings    Noted on cultures from the bronchoscopy there was both staph and the mold, she stated she did not take antibiotics or antifungals, but she is pretty much asymptomatic, will refer to infectious disease to determine if any treatment is needed at all    5/9/2024  Patient presents today for surveillance visit  CT scan of the chest showed resolution of the previous lung nodule of concern, there was other stable findings  At this time she remains with no evidence of disease  She has completed 5+ years of surveillance  Will move at this time to a yearly CT scan, continue neurology follow-up for her memory decline  She was not able to see infectious disease, but at this time she is asymptomatic from pulmonary standpoint, and she wants to defer seeing either pulmonary or ID, we discussed potential infection/pneumonia symptoms.    5/8/2025:  Discussed results of CT chest with no c/f disease progression and stable findings.  Per daughter prefers scan in 18 months. Sees her PCP every 4 months.   F/U w/PCP and neurology     2. hx of breast cancer   s/p right mastectomy   completed arimidex adjuvant endocrine therapy      RTC in 6 months virtually and plan for CT in 18 months.     Patient verbalized understanding, and all his questions were answered to his satisfaction    30 min spent with patient greater than 50 % of which was spent in consultation and coordination of care via telehealth.            [1] No Known Allergies  [2]   Current Outpatient Medications on File Prior to Visit   Medication Sig Dispense Refill    acetaminophen (TylenoL) 325 mg tablet Take 2 tablets (650 mg) by mouth every 4 hours if needed.      amLODIPine (Norvasc) 5 mg tablet Take 1 tablet by mouth once daily 90 tablet 1    aspirin 81 mg EC tablet Chew.      atorvastatin (Lipitor) 40 mg tablet Take 1 tablet by mouth once daily 90 tablet 1    b complex vitamins capsule Take 1 capsule by mouth once daily.      calcium carbonate-vitamin  D3 500 mg-5 mcg (200 unit) tablet Take 1,000 tablets by mouth once daily.      cetirizine (ZyrTEC) 10 mg tablet Take 1 tablet (10 mg) by mouth once daily.      levothyroxine (Synthroid, Levoxyl) 50 mcg tablet Take 1 tablet (50 mcg) by mouth early in the morning.. Take on an empty stomach at the same time each day, either 30 to 60 minutes prior to breakfast 90 tablet 3    memantine (Namenda) 10 mg tablet Take 1 tablet (10 mg) by mouth 2 times a day. 180 tablet 1    metFORMIN  mg 24 hr tablet TAKE 1 TABLET BY MOUTH ONCE DAILY IN THE EVENING WITH MEALS 90 tablet 3     No current facility-administered medications on file prior to visit.

## 2025-08-14 DIAGNOSIS — I10 PRIMARY HYPERTENSION: ICD-10-CM

## 2025-08-14 DIAGNOSIS — E78.2 MIXED HYPERLIPIDEMIA: ICD-10-CM

## 2025-08-14 RX ORDER — AMLODIPINE BESYLATE 5 MG/1
5 TABLET ORAL DAILY
Qty: 90 TABLET | Refills: 1 | Status: SHIPPED | OUTPATIENT
Start: 2025-08-14

## 2025-08-14 RX ORDER — ATORVASTATIN CALCIUM 40 MG/1
40 TABLET, FILM COATED ORAL DAILY
Qty: 90 TABLET | Refills: 1 | Status: SHIPPED | OUTPATIENT
Start: 2025-08-14

## 2025-09-15 ENCOUNTER — APPOINTMENT (OUTPATIENT)
Dept: NEUROLOGY | Facility: CLINIC | Age: 80
End: 2025-09-15
Payer: MEDICARE

## 2025-09-17 ENCOUNTER — APPOINTMENT (OUTPATIENT)
Dept: PRIMARY CARE | Facility: CLINIC | Age: 80
End: 2025-09-17
Payer: MEDICARE